# Patient Record
Sex: FEMALE | Race: WHITE | Employment: UNEMPLOYED | URBAN - METROPOLITAN AREA
[De-identification: names, ages, dates, MRNs, and addresses within clinical notes are randomized per-mention and may not be internally consistent; named-entity substitution may affect disease eponyms.]

---

## 2021-05-09 ENCOUNTER — APPOINTMENT (OUTPATIENT)
Dept: GENERAL RADIOLOGY | Age: 86
End: 2021-05-09
Payer: MEDICARE

## 2021-05-09 ENCOUNTER — APPOINTMENT (OUTPATIENT)
Dept: CT IMAGING | Age: 86
End: 2021-05-09
Payer: MEDICARE

## 2021-05-09 ENCOUNTER — HOSPITAL ENCOUNTER (EMERGENCY)
Age: 86
Discharge: HOME | End: 2021-05-09
Attending: STUDENT IN AN ORGANIZED HEALTH CARE EDUCATION/TRAINING PROGRAM
Payer: MEDICARE

## 2021-05-09 VITALS
DIASTOLIC BLOOD PRESSURE: 84 MMHG | SYSTOLIC BLOOD PRESSURE: 155 MMHG | RESPIRATION RATE: 18 BRPM | WEIGHT: 125 LBS | TEMPERATURE: 97.8 F | HEIGHT: 60 IN | HEART RATE: 84 BPM | BODY MASS INDEX: 24.54 KG/M2 | OXYGEN SATURATION: 98 %

## 2021-05-09 DIAGNOSIS — R03.0 ELEVATED BLOOD PRESSURE READING: ICD-10-CM

## 2021-05-09 DIAGNOSIS — K63.89 COLONIC MASS: Primary | ICD-10-CM

## 2021-05-09 DIAGNOSIS — N39.0 ACUTE UTI: ICD-10-CM

## 2021-05-09 DIAGNOSIS — R91.8 PULMONARY NODULES: ICD-10-CM

## 2021-05-09 DIAGNOSIS — C78.7 LIVER METASTASES (HCC): ICD-10-CM

## 2021-05-09 DIAGNOSIS — K13.21 LEUKOPLAKIA, TONGUE: ICD-10-CM

## 2021-05-09 DIAGNOSIS — R06.02 SHORTNESS OF BREATH: ICD-10-CM

## 2021-05-09 DIAGNOSIS — R10.84 GENERALIZED ABDOMINAL PAIN: ICD-10-CM

## 2021-05-09 DIAGNOSIS — J18.9 PNEUMONIA, UNSPECIFIED ORGANISM: ICD-10-CM

## 2021-05-09 LAB
A/G RATIO: 0.9 (ref 1.1–2.2)
ALBUMIN SERPL-MCNC: 3.6 G/DL (ref 3.4–5)
ALP BLD-CCNC: 413 U/L (ref 40–129)
ALT SERPL-CCNC: 79 U/L (ref 10–40)
ANION GAP SERPL CALCULATED.3IONS-SCNC: 13 MMOL/L (ref 3–16)
AST SERPL-CCNC: 119 U/L (ref 15–37)
BACTERIA: ABNORMAL /HPF
BASOPHILS ABSOLUTE: 0.1 K/UL (ref 0–0.2)
BASOPHILS RELATIVE PERCENT: 0.6 %
BILIRUB SERPL-MCNC: 1 MG/DL (ref 0–1)
BILIRUBIN URINE: NEGATIVE
BLOOD, URINE: ABNORMAL
BUN BLDV-MCNC: 13 MG/DL (ref 7–20)
CALCIUM SERPL-MCNC: 9.4 MG/DL (ref 8.3–10.6)
CHLORIDE BLD-SCNC: 94 MMOL/L (ref 99–110)
CLARITY: CLEAR
CO2: 25 MMOL/L (ref 21–32)
COLOR: YELLOW
CREAT SERPL-MCNC: 0.7 MG/DL (ref 0.6–1.2)
D DIMER: 1472 NG/ML DDU (ref 0–229)
EKG ATRIAL RATE: 76 BPM
EKG DIAGNOSIS: NORMAL
EKG P AXIS: 35 DEGREES
EKG P-R INTERVAL: 136 MS
EKG Q-T INTERVAL: 384 MS
EKG QRS DURATION: 90 MS
EKG QTC CALCULATION (BAZETT): 432 MS
EKG R AXIS: 51 DEGREES
EKG T AXIS: 32 DEGREES
EKG VENTRICULAR RATE: 76 BPM
EOSINOPHILS ABSOLUTE: 0.9 K/UL (ref 0–0.6)
EOSINOPHILS RELATIVE PERCENT: 9.8 %
EPITHELIAL CELLS, UA: ABNORMAL /HPF (ref 0–5)
GFR AFRICAN AMERICAN: >60
GFR NON-AFRICAN AMERICAN: >60
GLOBULIN: 3.8 G/DL
GLUCOSE BLD-MCNC: 93 MG/DL (ref 70–99)
GLUCOSE URINE: NEGATIVE MG/DL
HCT VFR BLD CALC: 37.2 % (ref 36–48)
HEMOGLOBIN: 12.3 G/DL (ref 12–16)
KETONES, URINE: 15 MG/DL
LEUKOCYTE ESTERASE, URINE: ABNORMAL
LIPASE: 28 U/L (ref 13–60)
LYMPHOCYTES ABSOLUTE: 1.6 K/UL (ref 1–5.1)
LYMPHOCYTES RELATIVE PERCENT: 17.1 %
MCH RBC QN AUTO: 29.9 PG (ref 26–34)
MCHC RBC AUTO-ENTMCNC: 33 G/DL (ref 31–36)
MCV RBC AUTO: 90.6 FL (ref 80–100)
MICROSCOPIC EXAMINATION: YES
MONOCYTES ABSOLUTE: 0.9 K/UL (ref 0–1.3)
MONOCYTES RELATIVE PERCENT: 9.8 %
NEUTROPHILS ABSOLUTE: 6 K/UL (ref 1.7–7.7)
NEUTROPHILS RELATIVE PERCENT: 62.7 %
NITRITE, URINE: POSITIVE
PDW BLD-RTO: 12.7 % (ref 12.4–15.4)
PH UA: 6 (ref 5–8)
PLATELET # BLD: 306 K/UL (ref 135–450)
PMV BLD AUTO: 8.3 FL (ref 5–10.5)
POTASSIUM REFLEX MAGNESIUM: 3.9 MMOL/L (ref 3.5–5.1)
PRO-BNP: 264 PG/ML (ref 0–449)
PROTEIN UA: NEGATIVE MG/DL
RBC # BLD: 4.11 M/UL (ref 4–5.2)
RBC UA: ABNORMAL /HPF (ref 0–4)
SARS-COV-2, NAAT: NOT DETECTED
SODIUM BLD-SCNC: 132 MMOL/L (ref 136–145)
SPECIFIC GRAVITY UA: <=1.005 (ref 1–1.03)
TOTAL PROTEIN: 7.4 G/DL (ref 6.4–8.2)
TROPONIN: <0.01 NG/ML
TSH REFLEX: 2.07 UIU/ML (ref 0.27–4.2)
URINE REFLEX TO CULTURE: ABNORMAL
URINE TYPE: ABNORMAL
UROBILINOGEN, URINE: 0.2 E.U./DL
WBC # BLD: 9.6 K/UL (ref 4–11)
WBC UA: ABNORMAL /HPF (ref 0–5)

## 2021-05-09 PROCEDURE — 93005 ELECTROCARDIOGRAM TRACING: CPT | Performed by: PHYSICIAN ASSISTANT

## 2021-05-09 PROCEDURE — 6360000004 HC RX CONTRAST MEDICATION: Performed by: PHYSICIAN ASSISTANT

## 2021-05-09 PROCEDURE — 85379 FIBRIN DEGRADATION QUANT: CPT

## 2021-05-09 PROCEDURE — 2580000003 HC RX 258: Performed by: PHYSICIAN ASSISTANT

## 2021-05-09 PROCEDURE — 83880 ASSAY OF NATRIURETIC PEPTIDE: CPT

## 2021-05-09 PROCEDURE — 74177 CT ABD & PELVIS W/CONTRAST: CPT

## 2021-05-09 PROCEDURE — 71260 CT THORAX DX C+: CPT

## 2021-05-09 PROCEDURE — 6370000000 HC RX 637 (ALT 250 FOR IP): Performed by: PHYSICIAN ASSISTANT

## 2021-05-09 PROCEDURE — 93010 ELECTROCARDIOGRAM REPORT: CPT | Performed by: INTERNAL MEDICINE

## 2021-05-09 PROCEDURE — 85025 COMPLETE CBC W/AUTO DIFF WBC: CPT

## 2021-05-09 PROCEDURE — 83690 ASSAY OF LIPASE: CPT

## 2021-05-09 PROCEDURE — 96365 THER/PROPH/DIAG IV INF INIT: CPT

## 2021-05-09 PROCEDURE — 87635 SARS-COV-2 COVID-19 AMP PRB: CPT

## 2021-05-09 PROCEDURE — 80053 COMPREHEN METABOLIC PANEL: CPT

## 2021-05-09 PROCEDURE — 84484 ASSAY OF TROPONIN QUANT: CPT

## 2021-05-09 PROCEDURE — 84443 ASSAY THYROID STIM HORMONE: CPT

## 2021-05-09 PROCEDURE — 6360000002 HC RX W HCPCS: Performed by: PHYSICIAN ASSISTANT

## 2021-05-09 PROCEDURE — 71045 X-RAY EXAM CHEST 1 VIEW: CPT

## 2021-05-09 PROCEDURE — 81001 URINALYSIS AUTO W/SCOPE: CPT

## 2021-05-09 PROCEDURE — 96375 TX/PRO/DX INJ NEW DRUG ADDON: CPT

## 2021-05-09 PROCEDURE — 99285 EMERGENCY DEPT VISIT HI MDM: CPT

## 2021-05-09 RX ORDER — AZITHROMYCIN 250 MG/1
250 TABLET, FILM COATED ORAL DAILY
Qty: 4 TABLET | Refills: 0 | Status: SHIPPED | OUTPATIENT
Start: 2021-05-09 | End: 2021-05-13

## 2021-05-09 RX ORDER — ONDANSETRON 2 MG/ML
4 INJECTION INTRAMUSCULAR; INTRAVENOUS ONCE
Status: COMPLETED | OUTPATIENT
Start: 2021-05-09 | End: 2021-05-09

## 2021-05-09 RX ORDER — CEFDINIR 300 MG/1
300 CAPSULE ORAL 2 TIMES DAILY
Qty: 20 CAPSULE | Refills: 0 | Status: ON HOLD | OUTPATIENT
Start: 2021-05-09 | End: 2021-05-23 | Stop reason: HOSPADM

## 2021-05-09 RX ORDER — 0.9 % SODIUM CHLORIDE 0.9 %
1000 INTRAVENOUS SOLUTION INTRAVENOUS ONCE
Status: COMPLETED | OUTPATIENT
Start: 2021-05-09 | End: 2021-05-09

## 2021-05-09 RX ORDER — AZITHROMYCIN 250 MG/1
500 TABLET, FILM COATED ORAL ONCE
Status: COMPLETED | OUTPATIENT
Start: 2021-05-09 | End: 2021-05-09

## 2021-05-09 RX ADMIN — AZITHROMYCIN 500 MG: 250 TABLET, FILM COATED ORAL at 19:22

## 2021-05-09 RX ADMIN — SODIUM CHLORIDE 1000 ML: 9 INJECTION, SOLUTION INTRAVENOUS at 15:17

## 2021-05-09 RX ADMIN — CEFTRIAXONE SODIUM 1000 MG: 1 INJECTION, POWDER, FOR SOLUTION INTRAMUSCULAR; INTRAVENOUS at 17:30

## 2021-05-09 RX ADMIN — IOPAMIDOL 75 ML: 755 INJECTION, SOLUTION INTRAVENOUS at 15:41

## 2021-05-09 RX ADMIN — IOPAMIDOL 85 ML: 755 INJECTION, SOLUTION INTRAVENOUS at 17:41

## 2021-05-09 RX ADMIN — ONDANSETRON HYDROCHLORIDE 4 MG: 2 INJECTION, SOLUTION INTRAMUSCULAR; INTRAVENOUS at 15:17

## 2021-05-09 ASSESSMENT — ENCOUNTER SYMPTOMS
BLOOD IN STOOL: 0
ABDOMINAL PAIN: 1
DIARRHEA: 0
COUGH: 0
NAUSEA: 1
SHORTNESS OF BREATH: 1
VOMITING: 0

## 2021-05-09 ASSESSMENT — PAIN SCALES - GENERAL: PAINLEVEL_OUTOF10: 6

## 2021-05-09 NOTE — ED PROVIDER NOTES
I independently examined and evaluated Emmy Taylor. In brief, Emmy Taylor is a 81 y.o. female with no significant past medical history but also lack of contact with the healthcare system, who presents to the ED complaining of multiple generalized symptoms. Patient reports at least a few weeks of generalized abdominal pain. She reports it is intermittent and nonfocal.  It is migratory, sometimes being in her upper abdomen and sometimes being in her lower abdomen. She denies nausea or vomiting. She does report decreased appetite because nothing tastes good and she sometimes feels like she has difficulty swallowing. She denies any choking episodes. She reports it as a aching moderate least severe pain. She denies diarrhea or constipation, states she typically has bowel movements every 2 to 3 days given less food intake. Denies vaginal bleeding or discharge or dysuria or increased urinary frequency. She denies any fever. She does report shortness of breath, she states she has chronic shortness of breath but it has been worse over the past 2 months. It is particularly worse when she exerts herself. She denies any chest pain. She reports occasional nonproductive cough. She denies any recent travel or surgery, hemoptysis, leg swelling, previous blood clot or malignancy, immobilization, OCP or hormone replacements. She does report weight loss. She denies any jaundice. She denies any alcohol or IV drug use. She denies any smoking history. Focused exam revealed   PHYSICAL EXAM  BP (!) 158/67   Pulse 81   Temp 97.8 °F (36.6 °C) (Oral)   Resp 18   Ht 5' (1.524 m)   Wt 125 lb (56.7 kg)   SpO2 97%   BMI 24.41 kg/m²    GENERAL APPEARANCE: Awake and alert. Cooperative. no distress. HENT: Normocephalic. Atraumatic. Mucous membranes are dry. nonscrapable film on tongue  NECK: Supple. Full range of motion of the neck without stiffness or pain.   EYES: PERRL. EOM's grossly intact. HEART/CHEST: RRR. No murmurs. Chest wall is not tender to palpation. LUNGS: Respirations unlabored. CTAB. Good air exchange. Speaking comfortably in full sentences. ABDOMEN: Mild generalized discomfort, no right upper quadrant focal tenderness to palpation. Negative Monroe sign. . Soft. Non-distended. No masses. No organomegaly. No guarding or rebound. MUSCULOSKELETAL: No extremity edema. Compartments soft. No deformity. No tenderness in the extremities. All extremities neurovascularly intact. SKIN: Warm and dry. No acute rashes. No jaundice. NEUROLOGICAL: Alert and oriented. CN's 2-12 intact. No gross facial drooping. Strength 5/5, sensation intact. PSYCHIATRIC: Normal mood and affect. ED course / MDM:   Overall well appearing patient, in no acute distress, presenting for generalized chronic abdominal pain and shortness of breath. Patient denies any medical history, but reports she does not see a doctor regularly physical exam remarkable for dry mucous membranes. Differential diagnosis includes but is not limited to: Malignancy, failure to thrive, pancreatitis, gastritis, PE,    CT CHEST PULMONARY EMBOLISM W CONTRAST   Final Result   1. Artifact degraded evaluation of the pulmonary arteries. No acute   pulmonary embolism to the proximal segmental arteries. 2. Opacities in the segmental and subsegmental left lower lobe airways. Aspiration and endobronchial lesion are in the differential.  Follow-up   recommended. 3. Nodular airspace disease in the right upper lobe and additional nodular   opacities measuring up to 0.4 cm. Correlate with presentation for early   pneumonia. Three-month follow-up recommended. 4. Nonspecific mediastinal and hilar adenopathy. Follow-up recommended. 5. Other findings as described. CT ABDOMEN PELVIS W IV CONTRAST Additional Contrast? None   Final Result   1.  Circumferential heterogeneous thickening of the ascending colon and circumferential thickening of the terminal ileum surrounding   edema/inflammation. Differential includes colonic mass. Recommend   colonoscopy for further evaluation. 2. Numerous hepatic nodules ranging in size from less than 1 cm to 2.8 cm. Recommend MRI of the liver for further evaluation. Differential includes   liver metastasis. 3. Gastrohepatic, estuardo hepatic, and peripancreatic adenopathy with multiple   nodes of low density suggestive of metastasis with central necrosis. 4. Although not well seen on the CT of the abdomen, there is a stellate   nodular density in the left lower lobe, partially seen. Recommend CT of the   chest for further evaluation. XR CHEST PORTABLE   Final Result   1. No acute radiographic abnormality in the chest.           The Ekg interpreted by me shows  normal sinus rhythm with a rate of 76  Axis is   Normal  QTc is  within an acceptable range  Intervals and Durations are unremarkable. ST Segments: nonspecific changes  No previous for comparison      ED Course as of May 10 2317   Sun May 09, 2021   1531 Lipase within normal limits, low suspicion for pancreatitis. [ER]   1531 BNP within normal limits, no evidence of fluid overload on exam or chest x-ray. Low suspicion for CHF. [ER]   1531 Troponin within normal limits. EKG without evidence of ischemia. Patient denies any chest pain, as of breath symptoms over 2 months. Low suspicion for ACS. [ER]   1533 Mild hyponatremia and hypochloremia. No other electrolyte abnormalities or evidence of kidney dysfunction. Patient will receive fluids. [ER]   1533 No leukocytosis, anemia, thrombocytopenia. [ER]   [id number] Liver function testing shows transaminitis of unknown etiology. Will obtain CT study. [ER]   1534 CXR: FINDINGS:  The cardiomediastinal silhouette is within normal range. Lungs are clear.   There is no focal pulmonary consolidation, pleural effusion, pneumothorax, or  evidence of airspace pulmonary edema.     IMPRESSION:  1. No acute radiographic abnormality in the chest.  ---------------------  Chest x-ray without evidence of pneumonia, pneumothorax, pleural effusion, mass, or pulmonary edema. [ER]   1618 CT abd/pelvis: IMPRESSION:  1. Circumferential heterogeneous thickening of the ascending colon and circumferential thickening of the terminal ileum surrounding edema/inflammation. Differential includes colonic mass. Recommend colonoscopy for further evaluation. 2. Numerous hepatic nodules ranging in size from less than 1 cm to 2.8 cm. Recommend MRI of the liver for further evaluation. Differential includes liver metastasis. 3. Gastrohepatic, estuardo hepatic, and peripancreatic adenopathy with multiple  nodes of low density suggestive of metastasis with central necrosis. 4. Although not well seen on the CT of the abdomen, there is a stellate nodular density in the left lower lobe, partially seen. Recommend CT of the chest for further evaluation.  ----------------------------  CT concerning for metastatic malignancy, will obtain chest imaging    [ER]   1619 CT abdomen pelvis with signs concerning for diffuse malignancy. Will obtain CT chest without contrast to further evaluate lesion noted in the left lower lobe. Patient has had stable shortness of breath over the past 2 months without any acute change. She denies any chest pain. She is not hypoxic. She has no other risk factors for pulmonary embolism other than newly found malignancy. Will obtain D-dimer to help further characterize whether or not patient requires CT PE study versus noncontrast CT of chest.    [ER]   1638 TSH within normal limits. Low suspicion for thyroid disease as the cause of weight loss. [ER]   1639 Covid swab negative. [ER]   8425 Urinalysis shows evidence of blood and infection. We'll treat with Rocephin.    [ER]   1651 D-dimer significantly elevated at 1472.   Will obtain CT PE study given shortness of breath, likely malignancy, and elevated D-dimer. She is receiving fluids. [ER]   1806 CT PE: IMPRESSION:  1. Artifact degraded evaluation of the pulmonary arteries. No acute  pulmonary embolism to the proximal segmental arteries. 2. Opacities in the segmental and subsegmental left lower lobe airways. Aspiration and endobronchial lesion are in the differential.  Follow-up  recommended. 3. Nodular airspace disease in the right upper lobe and additional nodular  opacities measuring up to 0.4 cm. Correlate with presentation for early  pneumonia. Three-month follow-up recommended. 4. Nonspecific mediastinal and hilar adenopathy. Follow-up recommended. 5. Other findings as described. [ER]   1857 Patient was offered admission for further workup and management of likely metastatic malignancy, but patient declines. Will plan for outpatient follow up    [ER]      ED Course User Index  [ER] Shy Sandy MD     Based on results of work-up, I am concerned for extensive metastatic malignancy, patient requires further workup and to discuss treatment options. Also patient has pneumonia and UTI. At this time, did recommend admission for expedited further work-up and management. However, patient declined. I discussed the nature and purpose, risks and benefits, as well as, the alternatives of admission with andrew Ashley. Annie Ramirez was given the time and opportunity to ask questions and consider their options, and after the discussion, Annie Ramirez decided to refuse. I informed Annie Ashley that refusal could lead to, but was not limited to, death, permanent disability, or severe pain. If present, I asked the relatives or significant others of Annie Ramirez to dissuade them without success. Prior to refusing, their nurse and I determined and agreed that Annie Ramirez had the capacity to make this decision and understood the consequences of that decision.  They appear clinically sober, to be mentating appropriately, free from distracting injury, appear to have intact insight, judgement, and reason. Specifically, they were able to verbally state back in a coherent manner their current medical condition, the proposed course of treatment, and the risks/benfits/ alternatives of treatment verses leaving against medical advice. After refusal, I made every reasonable opportunity to treat [place] Shaffer to the best of my ability. They understand that they may return to seek medical attention here whenever they choose. Consider this an informed discharge. Based on patient preference, patient will be discharged to follow-up with a primary care doctor, GI, and Oncology. Patient was provided with prescriptions for cefdinir and azithromycin. Strict return precautions given. Given referrals for PCP, GI, Oncology, Surgical Oncology. Patient discharged in stable condition. CLINICAL IMPRESSION  1. Colonic mass    2. Liver metastases ([facility] [address]    3. Pulmonary nodules    4. Pneumonia, unspecified organism    5. Acute UTI    6. Leukoplakia, tongue    7. Elevated blood pressure reading    8. Shortness of breath    9. Generalized abdominal pain        Blood pressure (!) 155/84, pulse 84, temperature 97.8 °F (36.6 °C), temperature source Oral, resp. rate 18, height 5' (1.524 m), weight 125 lb (56.7 kg), SpO2 98 %. Leland Gaona was discharged to home in stable condition. All diagnostic, treatment, and disposition decisions were made by myself in conjunction with the advanced practice provider. For all further details of the patient's emergency department visit, please see the advanced practice provider's documentation. Comment: Please note this report has been produced using speech recognition software and may contain errors related to that system including errors in grammar, punctuation, and spelling, as well as words and phrases that may be inappropriate.  If there are any

## 2021-05-09 NOTE — ED PROVIDER NOTES
[address] ED  EMERGENCY DEPARTMENT ENCOUNTER        Pt Name: Ave Cheema  MRN: [id number]  [place] 1935  Date of evaluation: 5/9/2021  Provider: Braxton Souza PA-C  PCP: No primary care provider on file. Shared Visit or Autonomous Visit:  I have seen and evaluated this patient with my supervising physician Kavitha Adhikari MD.    CHIEF COMPLAINT       Chief Complaint   Patient presents with    Other     80 yo with generalized c/o of not being well for past couple of mos with wt loss difficulty swallowing due to problem with tongue. Does not have a pcp. Denies any health problems.  Shortness of Breath     exertional SOB past couple of mos denies any lung problems no hx of smoking. HISTORY OF PRESENT ILLNESS   (Location/Symptom, Timing/Onset, Context/Setting, Quality, Duration, Modifying Factors, Severity)  Note limiting factors. Ave Cheema is a 81 y.o. female brought in by family for evaluation general fatigue and weakness, abdominal pain, nausea, shortness of breath and weight loss symptoms for the past 2 months. No fevers. No urinary symptoms. No cough. No leg swelling. States started getting sick about 9 days after her first COVID-19 vaccine Amarilys Luna was on January 26 had her second vaccine February 26. Denies any chest pain. No known heart or lung problems. Never a smoker. No leg swelling. The history is provided by the patient and a relative. Abdominal Pain  Pain location:  Generalized  Pain quality: aching    Pain radiates to:  Does not radiate  Onset quality:  Gradual  Duration: 2 months.   Progression:  Worsening  Chronicity:  New  Relieved by:  Nothing  Worsened by:  Palpation  Associated symptoms: fatigue, nausea and shortness of breath    Associated symptoms: no chest pain, no cough, no diarrhea, no dysuria, no fever and no vomiting    Shortness of Breath  Onset quality:  Gradual  Duration:  2 months  Progression: Worsening  Chronicity:  New  Associated symptoms: abdominal pain    Associated symptoms: no chest pain, no cough, no fever and no vomiting    Fatigue  Onset quality:  Gradual  Duration: 2 months. Progression:  Worsening  Chronicity:  New  Associated symptoms: abdominal pain, nausea and shortness of breath    Associated symptoms: no chest pain, no cough, no diarrhea, no dysuria, no fever, no loss of consciousness and no vomiting      Nursing Notes were reviewed    REVIEW OF SYSTEMS    (2-9 systems for level 4, 10 or more for level 5)     Review of Systems   Constitutional: Positive for fatigue and unexpected weight change. Negative for fever. Respiratory: Positive for shortness of breath. Negative for cough. Cardiovascular: Negative for chest pain and leg swelling. Gastrointestinal: Positive for abdominal pain and nausea. Negative for blood in stool, diarrhea and vomiting. Genitourinary: Negative for difficulty urinating and dysuria. Neurological: Negative for loss of consciousness. All other systems reviewed and are negative. Positives and Pertinent negatives as per HPI. PAST MEDICAL HISTORY   History reviewed. No pertinent past medical history. SURGICAL HISTORY     Past Surgical History:   Procedure Laterality Date    APPENDECTOMY      HYSTERECTOMY           CURRENTMEDICATIONS       Discharge Medication List as of 5/9/2021  7:14 PM            ALLERGIES     Patient has no known allergies. FAMILYHISTORY     History reviewed. No pertinent family history. SOCIAL HISTORY       Social History     Socioeconomic History    Marital status:       Spouse name: None    Number of children: None    Years of education: None    Highest education level: None   Occupational History    None   Social Needs    Financial resource strain: None    Food insecurity     Worry: None     Inability: None    Transportation needs     Medical: None     Non-medical: None   Tobacco Use    Smoking status: Never Smoker   Substance and Sexual Activity    Alcohol use: Never     Frequency: Never    Drug use: None    Sexual activity: None   Lifestyle    Physical activity     Days per week: None     Minutes per session: None    Stress: None   Relationships    Social connections     Talks on phone: None     Gets together: None     Attends Jehov[employer] witness service: None     Active member of club or organization: None     Attends meetings of clubs or organizations: None     Relationship status: None    Intimate partner violence     Fear of current or ex partner: None     Emotionally abused: None     Physically abused: None     Forced sexual activity: None   Other Topics Concern    None   Social History Narrative    None       SCREENINGS    Brad Coma Scale  Eye Opening: Spontaneous  Best Verbal Response: None        PHYSICAL EXAM    (up to 7 for level 4, 8 or more for level 5)     ED Triage Vitals [05/09/21 1347]   BP Temp Temp Source Pulse Resp SpO2 Height Weight   (!) 184/58 97.8 °F (36.6 °C) Oral 82 18 97 % 5' (1.524 m) 125 lb (56.7 kg)       Physical Exam  Vitals signs and nursing note reviewed. Constitutional:       Appearance: She is well-developed. She is not toxic-appearing. HENT:      Head: Normocephalic and atraumatic. Mouth/Throat:      Mouth: Mucous membranes are dry. Pharynx: Oropharynx is clear. No oropharyngeal exudate or posterior oropharyngeal erythema. Comments: Plaque on tongue  Eyes:      Conjunctiva/sclera: Conjunctivae normal.      Pupils: Pupils are equal, round, and reactive to light. Neck:      Musculoskeletal: Normal range of motion and neck supple. Vascular: No JVD. Cardiovascular:      Rate and Rhythm: Normal rate and regular rhythm. Pulmonary:      Effort: Pulmonary effort is normal. No respiratory distress. Breath sounds: Normal breath sounds. No wheezing, rhonchi or rales.    Abdominal:      General: Bowel sounds are normal. There is no distension. Palpations: Abdomen is soft. Abdomen is not rigid. There is no mass. Tenderness: There is generalized abdominal tenderness (mild). There is no right CVA tenderness, left CVA tenderness, guarding or rebound. Musculoskeletal: Normal range of motion. Right lower leg: No edema. Left lower leg: No edema. Skin:     General: Skin is warm and dry. Findings: No rash. Neurological:      Mental Status: She is alert and oriented to person, place, and time. GCS: GCS eye subscore is 4. GCS verbal subscore is 5. GCS motor subscore is 6. Cranial Nerves: No cranial nerve deficit. Sensory: No sensory deficit. Motor: No abnormal muscle tone.       Coordination: Coordination normal.   Psychiatric:         Behavior: Behavior normal.         DIAGNOSTIC RESULTS   LABS:    Labs Reviewed   CBC WITH AUTO DIFFERENTIAL - Abnormal; Notable for the following components:       Result Value    Eosinophils Absolute 0.9 (*)     All other components within normal limits    Narrative:     Performed at:  [place] [address],  [facility]   Phone [phone]   COMPREHENSIVE METABOLIC PANEL W/ REFLEX TO MG FOR LOW K - Abnormal; Notable for the following components:    Sodium 132 (*)     Chloride 94 (*)     Albumin/Globulin Ratio 0.9 (*)     Alkaline Phosphatase 413 (*)     ALT 79 (*)      (*)     All other components within normal limits    Narrative:     Performed at:  [place] [address],  [place], [facility]   Phone [phone]   URINE RT REFLEX TO CULTURE - Abnormal; Notable for the following components:    Ketones, Urine 15 (*)     Blood, Urine SMALL (*)     Nitrite, Urine POSITIVE (*)     Leukocyte Esterase, Urine SMALL (*)     All other components within normal limits    Narrative:     Performed at:  [facility]) [place] [address],  [place], [place] [address]   Phone [phone]   MICROSCOPIC URINALYSIS - Abnormal; Notable for the following components:    WBC, UA 6-9 (*)     Bacteria, UA 4+ (*)     All other components within normal limits    Narrative:     Performed at:  [facility] [address],  [place], [employer] [facility]   Phone [phone]   D-DIMER, QUANTITATIVE - Abnormal; Notable for the following components:    D-Dimer, Quant 1472 (*)     All other components within normal limits    Narrative:     Performed at:  [facility] [address],  [place], [employer] [facility]   Phone [phone], RAPID    Narrative:     Performed at:  [place] [address],  [place], [employer] [facility]   Phone [phone]   TROPONIN    Narrative:     Performed at:  [place] [address],  [place], [place] [employer]   Phone [phone]   BRAIN NATRIURETIC PEPTIDE    Narrative:     Performed at:  [place] [address],  [place], [place] [employer]   Phone [phone]   LIPASE    Narrative:     Performed at:  [place] [address],  [place], [employer] [facility]   Phone [phone]   TSH WITH REFLEX    Narrative:     Performed at:  [facility] [address],  [employer]   Phone [phone]     Results for orders placed or performed during the hospital encounter of 05/09/21   COVID-19, Rapid    Specimen: Nasopharyngeal Swab; Throat   Result Value Ref Range    SARS-CoV-2, NAAT Not Detected Not Detected   CBC Auto Differential   Result Value Ref Range    WBC 9.6 4.0 - 11.0 K/uL    RBC 4.11 4.00 - 5.20 M/uL    Hemoglobin 12.3 12.0 - 16.0 g/dL    Hematocrit 37.2 36.0 - 48.0 %    MCV 90.6 80.0 - 100.0 fL    MCH 29.9 26.0 - 34.0 pg    MCHC 33.0 31.0 - 36.0 g/dL    RDW 12.7 12.4 - 15.4 %    Platelets 907 601 - 219 K/uL    MPV 8.3 5.0 - 10.5 fL    Neutrophils % 62.7 %    Lymphocytes % 17.1 %    Monocytes % 9.8 %    Eosinophils % 9.8 %    Basophils % 0.6 %    Neutrophils Absolute 6.0 1.7 - 7.7 K/uL    Lymphocytes Absolute 1.6 1.0 - 5.1 K/uL    Monocytes Absolute 0.9 0.0 - 1.3 K/uL    Eosinophils Absolute 0.9 (H) 0.0 - 0.6 K/uL    Basophils Absolute 0.1 0.0 - 0.2 K/uL   Comprehensive Metabolic Panel w/ Reflex to MG   Result Value Ref Range    Sodium 132 (L) 136 - 145 mmol/L    Potassium reflex Magnesium 3.9 3.5 - 5.1 mmol/L    Chloride 94 (L) 99 - 110 mmol/L    CO2 25 21 - 32 mmol/L    Anion Gap 13 3 - 16    Glucose 93 70 - 99 mg/dL    BUN 13 7 - 20 mg/dL    CREATININE 0.7 0.6 - 1.2 mg/dL    GFR Non-African American >60 >60    GFR African American >60 >60    Calcium 9.4 8.3 - 10.6 mg/dL    Total Protein 7.4 6.4 - 8.2 g/dL    Albumin 3.6 3.4 - 5.0 g/dL    Albumin/Globulin Ratio 0.9 (L) 1.1 - 2.2    Total Bilirubin 1.0 0.0 - 1.0 mg/dL    Alkaline Phosphatase 413 (H) 40 - 129 U/L    ALT 79 (H) 10 - 40 U/L     (H) 15 - 37 U/L    Globulin 3.8 g/dL   Troponin   Result Value Ref Range    Troponin <0.01 <0.01 ng/mL   Brain Natriuretic Peptide   Result Value Ref Range    Pro- 0 - 449 pg/mL   Urinalysis Reflex to Culture    Specimen: Urine, clean catch   Result Value Ref Range    Color, UA Yellow Straw/Yellow    Clarity, UA Clear Clear    Glucose, Ur Negative Negative mg/dL    Bilirubin Urine Negative Negative    Ketones, Urine 15 (A) Negative mg/dL    Specific Gravity, UA <=1.005 1.005 - 1.030    Blood, Urine SMALL (A) Negative    pH, UA 6.0 5.0 - 8.0    Protein, UA Negative Negative mg/dL    Urobilinogen, Urine 0.2 <2.0 E.U./dL    Nitrite, Urine POSITIVE (A) Negative    Leukocyte Esterase, Urine SMALL (A) Negative    Microscopic Examination YES     Urine Type see below     Urine Reflex to Culture Not Indicated    Lipase   Result Value Ref Range    Lipase 28.0 13.0 - 60.0 U/L   TSH with Reflex   Result Value Ref Range    TSH 2.07 0.27 None   Preliminary Result   1. Circumferential heterogeneous thickening of the ascending colon and   circumferential thickening of the terminal ileum surrounding   edema/inflammation. Differential includes colonic mass. Recommend   colonoscopy for further evaluation. 2. Numerous hepatic nodules ranging in size from less than 1 cm to 2.8 cm. Recommend MRI of the liver for further evaluation. Differential includes   liver metastasis. 3. Gastrohepatic, estuardo hepatic, and peripancreatic adenopathy with multiple   nodes of low density suggestive of metastasis with central necrosis. 4. Although not well seen on the CT of the abdomen, there is a stellate   nodular density in the left lower lobe, partially seen. Recommend CT of the   chest for further evaluation. XR CHEST PORTABLE   Final Result   1. No acute radiographic abnormality in the chest.           Ct Abdomen Pelvis W Iv Contrast Additional Contrast? None    Result Date: 5/9/2021  EXAMINATION: CT OF THE ABDOMEN AND PELVIS WITH CONTRAST 5/9/2021 3:41 pm TECHNIQUE: CT of the abdomen and pelvis was performed with the administration of intravenous contrast. Multiplanar reformatted images are provided for review. Dose modulation, iterative reconstruction, and/or weight based adjustment of the mA/kV was utilized to reduce the radiation dose to as low as reasonably achievable. COMPARISON: None HISTORY: ORDERING SYSTEM PROVIDED HISTORY: abdominal pain, nausea, elevated LFTs, weight loss TECHNOLOGIST PROVIDED HISTORY: Reason for exam:->abdominal pain, nausea, elevated LFTs, weight loss Additional Contrast?->None Decision Support Exception - unselect if not a suspected or confirmed emergency medical condition->Emergency Medical Condition (MA) Reason for Exam: Pt states she has been out of energy, and is having upper abdominal pain with standing. Acuity: Acute Type of Exam: Initial FINDINGS: Lower Chest: No pleural effusion.   Although not completely imaged CT of the chest for further evaluation. Xr Chest Portable    Result Date: 5/9/2021  EXAMINATION: ONE XRAY VIEW OF THE CHEST 5/9/2021 1:57 pm COMPARISON: None HISTORY: ORDERING SYSTEM PROVIDED HISTORY: shortness of breath TECHNOLOGIST PROVIDED HISTORY: Reason for exam:->shortness of breath Reason for Exam: Shortness of breath Acuity: Acute Type of Exam: Initial FINDINGS: The cardiomediastinal silhouette is within normal range. Lungs are clear. There is no focal pulmonary consolidation, pleural effusion, pneumothorax, or evidence of airspace pulmonary edema. 1. No acute radiographic abnormality in the chest.     Ct Chest Pulmonary Embolism W Contrast    Result Date: 5/9/2021  EXAMINATION: CTA OF THE CHEST 5/9/2021 5:41 pm TECHNIQUE: CTA of the chest was performed after the administration of intravenous contrast.  Multiplanar reformatted images are provided for review. MIP images are provided for review. Dose modulation, iterative reconstruction, and/or weight based adjustment of the mA/kV was utilized to reduce the radiation dose to as low as reasonably achievable. COMPARISON: None. HISTORY: ORDERING SYSTEM PROVIDED HISTORY: shortness of breath, elevated d-dimer, r/o PE TECHNOLOGIST PROVIDED HISTORY: Reason for exam:->shortness of breath, elevated d-dimer, r/o PE Decision Support Exception - unselect if not a suspected or confirmed emergency medical condition->Emergency Medical Condition (MA) Reason for Exam: elevated d dimer, pt having some fatique and sob Acuity: Acute Type of Exam: Initial FINDINGS: Pulmonary Arteries: Pulmonary arteries are within normal limits in size. . Artifact degraded evaluation of the pulmonary arteries. No filling defect is identified in the pulmonary arteries to the level of theproximal segmental arteries. Mediastinum: Heart is within normal limits in size. Nodular pericardial thickening. Aorta is within normal limits in size.  No luminal defect is appreciated in the visualized thoracic aorta. Coronary artery calcifications noted. Lungs/pleura: Central airways are patent. Opacification of segmental and subsegmental bronchi in the left lower lobe. Nodular airspace disease in the right upper lobe. Scattered additional nodular opacities measuring up to 0.4 cm. Scattered atelectasis noted. No pleural effusion. No pneumothorax. Soft Tissues/Bones: Mediastinal and hilar adenopathy. Scattered degenerative changes noted in the visualized spine without spondylolisthesis. Upper Abdomen: Granuloma in the spleen. 1.  Artifact degraded evaluation of the pulmonary arteries. No acute pulmonary embolism to the proximal segmental arteries. 2. Opacities in the segmental and subsegmental left lower lobe airways. Aspiration and endobronchial lesion are in the differential.  Follow-up recommended. 3. Nodular airspace disease in the right upper lobe and additional nodular opacities measuring up to 0.4 cm. Correlate with presentation for early pneumonia. Three-month follow-up recommended. 4. Nonspecific mediastinal and hilar adenopathy. Follow-up recommended. 5. Other findings as described.          PROCEDURES   Unless otherwise noted below, none     Procedures    CRITICAL CARE TIME   N/A    CONSULTS:  None      EMERGENCY DEPARTMENT COURSE and DIFFERENTIAL DIAGNOSIS/MDM:   Vitals:    Vitals:    05/09/21 1600 05/09/21 1700 05/09/21 1756 05/09/21 1900   BP: (!) 155/85 (!) 150/84 (!) 145/84 (!) 155/84   Pulse: 75 74 78 84   Resp: 16 16 18 18   Temp:       TempSrc:       SpO2: 98% 98% 98% 98%   Weight:       Height:           Patient was given thefollowing medications:  Medications   0.9 % sodium chloride bolus (0 mLs Intravenous Stopped 5/9/21 1617)   ondansetron (ZOFRAN) injection 4 mg (4 mg Intravenous Given 5/9/21 1517)   iopamidol (ISOVUE-370) 76 % injection 75 mL (75 mLs Intravenous Given 5/9/21 1541)   cefTRIAXone (ROCEPHIN) 1000 mg IVPB in 50 mL D5W minibag (0 mg Intravenous Stopped 5/9/21 1800) iopamidol (ISOVUE-370) 76 % injection 85 mL (85 mLs Intravenous Given 5/9/21 1741)   azithromycin (ZITHROMAX) tablet 500 mg (500 mg Oral Given 5/9/21 1922)         6:49 PM EDT  Patient presented for evaluation of abdominal pain, general fatigue, weight loss and shortness of breath. Work-up was obtained. On labs white count 9.6. Hemoglobin 12.3. Hematocrit 37.2. Sodium 132. Potassium 3.9. Chloride 94. Glucose 93. Normal renal function. Elevated LFTs alk phos 413 ALT 79 . EKG normal sinus rhythm nonspecific ST changes. Troponin is normal.  . Urinalysis positive nitrites and leukocyte esterase treated for UTI with Rocephin. CT abdomen pelvis showing thickening of the colon suspect colon mass. Lesions in the liver suspect metastasis. Gastric and  Pancreatic nodules. CT chest no PE. Pulmonary nodules and possible pneumonia. Offered admission into the hospital for further evaluation of CT findings discussed with her these findings discussed suspect cancer. She understands. She is declining admission. She does not want to stay in the hospital.  She wants to go home. Family at bedside during discussion. Patient complaining of spot on her tongue this appears to be leukoplakia. Does not appear to be thrush there are no other plaques. Suspect this is due to cancer. Referrals provided for primary care, gastroenterology, discussed she will need a colonoscopy, oncology and surgical oncology. We discussed the importance of close follow-up to contact physicians tomorrow to arrange follow-up. Family understands. Prescriptions for Zithromax and Omnicef. Advised return to the ER for any worsening symptoms. I estimate there is LOW risk for ACUTE APPENDICITIS, BOWEL OBSTRUCTION, CHOLECYSTITIS, DIVERTICULITIS, INCARCERATED HERNIA, PANCREATITIS, PERFORATED BOWEL, BOWEL ISCHEMIA, GONADAL TORSION, OR CARDIAC ISCHEMIA, thus I consider the discharge disposition reasonable.  Also, there is no evidence or peritonitis, sepsis, or toxicity. I estimate there is LOW risk for PULMONARY EMBOLISM, PNEUMOTHORAX, STATUS ASTHMATICUS, ACUTE RESPIRATORY FAILURE, OR ACUTE CORONARY SYNDROME, thus I consider the discharge disposition reasonable. FINAL IMPRESSION      1. Colonic mass    2. Liver metastases ([address]    3. Pulmonary nodules    4. Pneumonia, unspecified organism    5. Acute UTI    6. Leukoplakia, tongue    7. Elevated blood pressure reading    8.  Shortness of breath    9. Generalized abdominal pain          DISPOSITION/PLAN   DISPOSITION     PATIENT REFERREDTO:  [facility]) Pre-Services  [phone]  Schedule an appointment as soon as possible for a visit   Primary care referral    Kalman Dandy, [address] [employer],[facility] [address], [address] [facility] [phone]    Schedule an appointment as soon as possible for a visit   Gastroenterology referral    Atiya Gray MD  [address]     Schedule an appointment as soon as possible for a visit   Oncology referral    José Gramajo MD  [address]  [facility] [address]  [phone]    Schedule an appointment as soon as possible for a visit   Surgical oncology    [facility] ED  [address] [facility]  [phone]    If symptoms worsen      DISCHARGE MEDICATIONS:  Discharge Medication List as of 5/9/2021  7:14 PM      START taking these medications    Details   cefdinir (OMNICEF) 300 MG capsule Take 1 capsule by mouth 2 times daily for 10 days, Disp-20 capsule, R-0Print      azithromycin (ZITHROMAX) 250 MG tablet Take 1 tablet by mouth daily for 4 days, Disp-4 tablet, R-0Print             DISCONTINUED MEDICATIONS:  Discharge Medication List as of 5/9/2021  7:14 PM                 (Please note that portions ofthis note were completed with a voice recognition program.  Efforts were made to edit the dictations but occasionally words are mis-transcribed.)    Selvin Cherry PA-C (electronically signed)            Abbey Martin PA-C  05/09/21 1942

## 2021-05-17 ENCOUNTER — APPOINTMENT (OUTPATIENT)
Dept: CT IMAGING | Age: 86
DRG: 330 | End: 2021-05-17
Payer: MEDICARE

## 2021-05-17 ENCOUNTER — APPOINTMENT (OUTPATIENT)
Dept: GENERAL RADIOLOGY | Age: 86
DRG: 330 | End: 2021-05-17
Payer: MEDICARE

## 2021-05-17 ENCOUNTER — HOSPITAL ENCOUNTER (INPATIENT)
Age: 86
LOS: 6 days | Discharge: HOME | DRG: 330 | End: 2021-05-23
Attending: STUDENT IN AN ORGANIZED HEALTH CARE EDUCATION/TRAINING PROGRAM | Admitting: SURGERY
Payer: MEDICARE

## 2021-05-17 DIAGNOSIS — R74.8 ELEVATED LIVER ENZYMES: ICD-10-CM

## 2021-05-17 DIAGNOSIS — N30.01 ACUTE CYSTITIS WITH HEMATURIA: ICD-10-CM

## 2021-05-17 DIAGNOSIS — K63.89 COLONIC MASS: ICD-10-CM

## 2021-05-17 DIAGNOSIS — K56.7 ILEUS (HCC): Primary | ICD-10-CM

## 2021-05-17 DIAGNOSIS — K92.2 UPPER GI BLEED: ICD-10-CM

## 2021-05-17 DIAGNOSIS — C78.7 LIVER METASTASES (HCC): ICD-10-CM

## 2021-05-17 PROBLEM — K56.609 SMALL BOWEL OBSTRUCTION (HCC): Status: ACTIVE | Noted: 2021-05-17

## 2021-05-17 LAB
A/G RATIO: 0.9 (ref 1.1–2.2)
ALBUMIN SERPL-MCNC: 3.9 G/DL (ref 3.4–5)
ALP BLD-CCNC: 584 U/L (ref 40–129)
ALT SERPL-CCNC: 167 U/L (ref 10–40)
ANION GAP SERPL CALCULATED.3IONS-SCNC: 15 MMOL/L (ref 3–16)
APTT: 27 SEC (ref 24.2–36.2)
AST SERPL-CCNC: 188 U/L (ref 15–37)
BACTERIA: ABNORMAL /HPF
BASOPHILS ABSOLUTE: 0.1 K/UL (ref 0–0.2)
BASOPHILS RELATIVE PERCENT: 0.6 %
BILIRUB SERPL-MCNC: 1.3 MG/DL (ref 0–1)
BILIRUBIN URINE: ABNORMAL
BLOOD, URINE: ABNORMAL
BUN BLDV-MCNC: 14 MG/DL (ref 7–20)
CALCIUM SERPL-MCNC: 9.9 MG/DL (ref 8.3–10.6)
CHLORIDE BLD-SCNC: 87 MMOL/L (ref 99–110)
CLARITY: ABNORMAL
CO2: 29 MMOL/L (ref 21–32)
COLOR: YELLOW
CREAT SERPL-MCNC: 0.8 MG/DL (ref 0.6–1.2)
EKG ATRIAL RATE: 85 BPM
EKG DIAGNOSIS: NORMAL
EKG P AXIS: 24 DEGREES
EKG P-R INTERVAL: 130 MS
EKG Q-T INTERVAL: 378 MS
EKG QRS DURATION: 82 MS
EKG QTC CALCULATION (BAZETT): 449 MS
EKG R AXIS: 42 DEGREES
EKG T AXIS: 61 DEGREES
EKG VENTRICULAR RATE: 85 BPM
EOSINOPHILS ABSOLUTE: 0.2 K/UL (ref 0–0.6)
EOSINOPHILS RELATIVE PERCENT: 1.3 %
EPITHELIAL CELLS, UA: ABNORMAL /HPF (ref 0–5)
GFR AFRICAN AMERICAN: >60
GFR NON-AFRICAN AMERICAN: >60
GLOBULIN: 4.3 G/DL
GLUCOSE BLD-MCNC: 95 MG/DL (ref 70–99)
GLUCOSE URINE: NEGATIVE MG/DL
HCT VFR BLD CALC: 39.9 % (ref 36–48)
HEMOGLOBIN: 13.2 G/DL (ref 12–16)
INR BLD: 1.14 (ref 0.86–1.14)
KETONES, URINE: 40 MG/DL
LEUKOCYTE ESTERASE, URINE: ABNORMAL
LIPASE: 34 U/L (ref 13–60)
LYMPHOCYTES ABSOLUTE: 1.8 K/UL (ref 1–5.1)
LYMPHOCYTES RELATIVE PERCENT: 13.4 %
MCH RBC QN AUTO: 29.9 PG (ref 26–34)
MCHC RBC AUTO-ENTMCNC: 33.2 G/DL (ref 31–36)
MCV RBC AUTO: 90.3 FL (ref 80–100)
MICROSCOPIC EXAMINATION: YES
MONOCYTES ABSOLUTE: 1.1 K/UL (ref 0–1.3)
MONOCYTES RELATIVE PERCENT: 8.2 %
MUCUS: ABNORMAL /LPF
NEUTROPHILS ABSOLUTE: 10.2 K/UL (ref 1.7–7.7)
NEUTROPHILS RELATIVE PERCENT: 76.5 %
NITRITE, URINE: NEGATIVE
OCCULT BLOOD DIAGNOSTIC: ABNORMAL
PDW BLD-RTO: 12.7 % (ref 12.4–15.4)
PH UA: 6 (ref 5–8)
PLATELET # BLD: 392 K/UL (ref 135–450)
PMV BLD AUTO: 8.8 FL (ref 5–10.5)
POTASSIUM REFLEX MAGNESIUM: 3.7 MMOL/L (ref 3.5–5.1)
PROTEIN UA: ABNORMAL MG/DL
PROTHROMBIN TIME: 13.2 SEC (ref 10–13.2)
RBC # BLD: 4.42 M/UL (ref 4–5.2)
RBC UA: ABNORMAL /HPF (ref 0–4)
SARS-COV-2, NAAT: NOT DETECTED
SODIUM BLD-SCNC: 131 MMOL/L (ref 136–145)
SPECIFIC GRAVITY UA: 1.02 (ref 1–1.03)
TOTAL PROTEIN: 8.2 G/DL (ref 6.4–8.2)
TROPONIN: <0.01 NG/ML
URINE REFLEX TO CULTURE: YES
URINE TYPE: ABNORMAL
UROBILINOGEN, URINE: 0.2 E.U./DL
WBC # BLD: 13.4 K/UL (ref 4–11)
WBC UA: ABNORMAL /HPF (ref 0–5)

## 2021-05-17 PROCEDURE — 85025 COMPLETE CBC W/AUTO DIFF WBC: CPT

## 2021-05-17 PROCEDURE — 80053 COMPREHEN METABOLIC PANEL: CPT

## 2021-05-17 PROCEDURE — 2580000003 HC RX 258: Performed by: SURGERY

## 2021-05-17 PROCEDURE — 99223 1ST HOSP IP/OBS HIGH 75: CPT | Performed by: SURGERY

## 2021-05-17 PROCEDURE — 6360000004 HC RX CONTRAST MEDICATION: Performed by: PHYSICIAN ASSISTANT

## 2021-05-17 PROCEDURE — 2500000003 HC RX 250 WO HCPCS: Performed by: SURGERY

## 2021-05-17 PROCEDURE — G0328 FECAL BLOOD SCRN IMMUNOASSAY: HCPCS

## 2021-05-17 PROCEDURE — 93005 ELECTROCARDIOGRAM TRACING: CPT | Performed by: PHYSICIAN ASSISTANT

## 2021-05-17 PROCEDURE — 6360000002 HC RX W HCPCS: Performed by: SURGERY

## 2021-05-17 PROCEDURE — 83690 ASSAY OF LIPASE: CPT

## 2021-05-17 PROCEDURE — 6370000000 HC RX 637 (ALT 250 FOR IP): Performed by: PHYSICIAN ASSISTANT

## 2021-05-17 PROCEDURE — 96365 THER/PROPH/DIAG IV INF INIT: CPT

## 2021-05-17 PROCEDURE — 1200000000 HC SEMI PRIVATE

## 2021-05-17 PROCEDURE — 85730 THROMBOPLASTIN TIME PARTIAL: CPT

## 2021-05-17 PROCEDURE — 71045 X-RAY EXAM CHEST 1 VIEW: CPT

## 2021-05-17 PROCEDURE — 94150 VITAL CAPACITY TEST: CPT

## 2021-05-17 PROCEDURE — 6360000002 HC RX W HCPCS: Performed by: PHYSICIAN ASSISTANT

## 2021-05-17 PROCEDURE — 87086 URINE CULTURE/COLONY COUNT: CPT

## 2021-05-17 PROCEDURE — 93010 ELECTROCARDIOGRAM REPORT: CPT | Performed by: INTERNAL MEDICINE

## 2021-05-17 PROCEDURE — 94761 N-INVAS EAR/PLS OXIMETRY MLT: CPT

## 2021-05-17 PROCEDURE — 74177 CT ABD & PELVIS W/CONTRAST: CPT

## 2021-05-17 PROCEDURE — 96375 TX/PRO/DX INJ NEW DRUG ADDON: CPT

## 2021-05-17 PROCEDURE — 85610 PROTHROMBIN TIME: CPT

## 2021-05-17 PROCEDURE — C9113 INJ PANTOPRAZOLE SODIUM, VIA: HCPCS | Performed by: PHYSICIAN ASSISTANT

## 2021-05-17 PROCEDURE — 96366 THER/PROPH/DIAG IV INF ADDON: CPT

## 2021-05-17 PROCEDURE — 81001 URINALYSIS AUTO W/SCOPE: CPT

## 2021-05-17 PROCEDURE — 84484 ASSAY OF TROPONIN QUANT: CPT

## 2021-05-17 PROCEDURE — 87635 SARS-COV-2 COVID-19 AMP PRB: CPT

## 2021-05-17 PROCEDURE — 99283 EMERGENCY DEPT VISIT LOW MDM: CPT

## 2021-05-17 PROCEDURE — 2580000003 HC RX 258: Performed by: PHYSICIAN ASSISTANT

## 2021-05-17 RX ORDER — ONDANSETRON 2 MG/ML
4 INJECTION INTRAMUSCULAR; INTRAVENOUS EVERY 6 HOURS PRN
Status: DISCONTINUED | OUTPATIENT
Start: 2021-05-17 | End: 2021-05-23 | Stop reason: HOSPADM

## 2021-05-17 RX ORDER — PANTOPRAZOLE SODIUM 40 MG/10ML
40 INJECTION, POWDER, LYOPHILIZED, FOR SOLUTION INTRAVENOUS ONCE
Status: COMPLETED | OUTPATIENT
Start: 2021-05-17 | End: 2021-05-17

## 2021-05-17 RX ORDER — PROMETHAZINE HYDROCHLORIDE 25 MG/ML
6.25 INJECTION, SOLUTION INTRAMUSCULAR; INTRAVENOUS EVERY 6 HOURS PRN
Status: DISCONTINUED | OUTPATIENT
Start: 2021-05-17 | End: 2021-05-23 | Stop reason: HOSPADM

## 2021-05-17 RX ORDER — SODIUM CHLORIDE 9 MG/ML
INJECTION, SOLUTION INTRAVENOUS CONTINUOUS
Status: DISCONTINUED | OUTPATIENT
Start: 2021-05-17 | End: 2021-05-23 | Stop reason: HOSPADM

## 2021-05-17 RX ORDER — ACETAMINOPHEN 325 MG/1
650 TABLET ORAL EVERY 6 HOURS PRN
Status: DISCONTINUED | OUTPATIENT
Start: 2021-05-17 | End: 2021-05-18

## 2021-05-17 RX ORDER — 0.9 % SODIUM CHLORIDE 0.9 %
1000 INTRAVENOUS SOLUTION INTRAVENOUS ONCE
Status: COMPLETED | OUTPATIENT
Start: 2021-05-17 | End: 2021-05-17

## 2021-05-17 RX ADMIN — ENOXAPARIN SODIUM 40 MG: 40 INJECTION SUBCUTANEOUS at 20:11

## 2021-05-17 RX ADMIN — IOPAMIDOL 75 ML: 755 INJECTION, SOLUTION INTRAVENOUS at 13:54

## 2021-05-17 RX ADMIN — CEFTRIAXONE SODIUM 1000 MG: 1 INJECTION, POWDER, FOR SOLUTION INTRAMUSCULAR; INTRAVENOUS at 13:33

## 2021-05-17 RX ADMIN — HYDROMORPHONE HYDROCHLORIDE 0.5 MG: 1 INJECTION, SOLUTION INTRAMUSCULAR; INTRAVENOUS; SUBCUTANEOUS at 20:17

## 2021-05-17 RX ADMIN — ONDANSETRON HYDROCHLORIDE 4 MG: 2 INJECTION, SOLUTION INTRAMUSCULAR; INTRAVENOUS at 20:18

## 2021-05-17 RX ADMIN — PANTOPRAZOLE SODIUM 40 MG: 40 INJECTION, POWDER, FOR SOLUTION INTRAVENOUS at 13:26

## 2021-05-17 RX ADMIN — SODIUM CHLORIDE: 9 INJECTION, SOLUTION INTRAVENOUS at 18:58

## 2021-05-17 RX ADMIN — SODIUM CHLORIDE 1000 ML: 9 INJECTION, SOLUTION INTRAVENOUS at 13:26

## 2021-05-17 RX ADMIN — FAMOTIDINE 20 MG: 10 INJECTION, SOLUTION INTRAVENOUS at 20:11

## 2021-05-17 RX ADMIN — NYSTATIN 500000 UNITS: 500000 SUSPENSION ORAL at 13:25

## 2021-05-17 ASSESSMENT — PAIN SCALES - GENERAL
PAINLEVEL_OUTOF10: 5
PAINLEVEL_OUTOF10: 8

## 2021-05-17 ASSESSMENT — PAIN DESCRIPTION - DESCRIPTORS: DESCRIPTORS: DISCOMFORT;ACHING

## 2021-05-17 ASSESSMENT — ENCOUNTER SYMPTOMS
ABDOMINAL PAIN: 1
RESPIRATORY NEGATIVE: 1
NAUSEA: 1
VOMITING: 1

## 2021-05-17 ASSESSMENT — PAIN DESCRIPTION - FREQUENCY: FREQUENCY: CONTINUOUS

## 2021-05-17 ASSESSMENT — PAIN DESCRIPTION - PAIN TYPE
TYPE: ACUTE PAIN
TYPE: ACUTE PAIN

## 2021-05-17 NOTE — H&P
[facility]    Department of General Surgery History & Physical    PATIENT NAME: Mannie Cardenas OF BIRTH: 1935    ADMISSION DATE: 5/17/2021 11:45 AM      TODAY'S DATE: 5/17/2021    Reason for admission:  SBO      HISTORY OF PRESENT ILLNESS:              The patient is a 81 y.o. female who presents with severe, crampy abdominal pain that has been primarily in the upper abdomen. She was in the ED 5/9  She was to have colonoscopy today, but pain increased with the prep. She had associated N/V with the prep as well. She has lost weight. She has had some SOB. She is passing decreased amounts of stool and flatus. Past Medical History:    History reviewed. No pertinent past medical history. Past Surgical History:        Procedure Laterality Date    APPENDECTOMY      HYSTERECTOMY         Current Medications:   Current Facility-Administered Medications: 0.9 % sodium chloride infusion, , Intravenous, Continuous  promethazine (PHENERGAN) injection 6.25 mg, 6.25 mg, Intravenous, Q6H PRN  HYDROmorphone (DILAUDID) injection 0.5 mg, 0.5 mg, Intravenous, Q3H PRN  enoxaparin (LOVENOX) injection 40 mg, 40 mg, Subcutaneous, Q24H  famotidine (PEPCID) injection 20 mg, 20 mg, Intravenous, Daily  ondansetron (ZOFRAN) injection 4 mg, 4 mg, Intravenous, Q6H PRN  acetaminophen (TYLENOL) tablet 650 mg, 650 mg, Oral, Q6H PRN  Prior to Admission medications    Medication Sig Start Date End Date Taking? Authorizing Provider   cefdinir (OMNICEF) 300 MG capsule Take 1 capsule by mouth 2 times daily for 10 days 5/9/21 5/19/21  Kelin Portillo PA-C        Allergies:  Patient has no known allergies. Social History:   TOBACCO:   reports that she has never smoked. She has never used smokeless tobacco.  ETOH:   reports no history of alcohol use. DRUGS:   reports no history of drug use. Family History:    History reviewed. No pertinent family history.     REVIEW OF SYSTEMS:  She reports no complaints related to the eyes, ears , nose throat or mouth. She admits to weight loss. No chest pain. She has had SOB. No urinary complaints. No musculoskeletal complaints. No skin rashes. No neurologic deficits. No bleeding tendencies. GI complaints include abdominal pain. PHYSICAL EXAM:  VITALS:  BP (!) 154/64   Pulse 78   Temp 98.6 °F (37 °C) (Oral)   Resp 16   Ht 5' (1.524 m)   Wt 128 lb 3.2 oz (58.2 kg)   SpO2 98%   BMI 25.04 kg/m²     CONSTITUTIONAL:  alert, no apparent distress and thin  EYES:  sclera clear  ENT:  normocepalic, without obvious abnormality  NECK:  supple, symmetrical, trachea midline   LUNGS:  clear to auscultation  CARDIOVASCULAR:  regular rate and rhythm   ABDOMEN:  Some distention, tenderness noted mid abdomen,and soft  MUSCULOSKELETAL:  No pitting edema lower extremities  NEUROLOGIC:  Mental Status Exam:  Level of Alertness:   awake  Orientation:   person, place, time  SKIN:  no rashes    DATA:    CBC:   Recent Labs     05/17/21  1202   WBC 13.4*   HGB 13.2   HCT 39.9        BMP:    Recent Labs     05/17/21  1202   *   K 3.7   CL 87*   CO2 29   BUN 14   CREATININE 0.8   GLUCOSE 95     Hepatic:   Recent Labs     05/17/21  1202   *   *   BILITOT 1.3*   ALKPHOS 584*       Radiology Review:  CT with dilated SB and abnormal cecum, TI and ascending colon concerning for tumor. Extensive hepatic masses consistent with metastatic disease    ASSESSMENT:  Small bowel obstruction from suspected R colon CA with hepatic metasases    PLAN:  The diagnosis and recommended procedure were explained. She needs palliation from obstruction. Questions answered from the patient and her son. They understand the imaging findings and that surgery is not expected to be curative but palliative. Prepare for surgery. Greater than 50% visit spent counseling about diagnosis, treatment plan and expected post operative course.           Darius Neil MD     [facility] Surgery

## 2021-05-17 NOTE — ED PROVIDER NOTES
I independently examined and evaluated Micheal Berkowitz. All diagnostic, treatment, and disposition decisions were made by myself in conjunction with the advanced practice provider. For all further details of the patient's emergency department visit, please see the advanced practice provider's documentation. Primary Care Physician: No primary care provider on file. History: This is a 81 y.o. female who presents to the Emergency Department with complaint of generalized fatigue. Patient does have a history of metastatic colon cancer was consuming prep for upcoming colonoscopy however she cannot keep this down states that she began to vomit, states that it was dark and she brought it back up. She states that she no longer is having abdominal pain or nausea however just states that she feels ill, fatigued. Lives with son. No chest pain no shortness of breath. Physical:     height is 5' (1.524 m) and weight is 125 lb (56.7 kg). Her oral temperature is 98 °F (36.7 °C). Her blood pressure is 147/57 (abnormal) and her pulse is 83. Her respiration is 16 and oxygen saturation is 96%.    81 y.o. female   [facility] female  Heart regular rhythm  Lungs clear  Abdomen no rigidity no guarding abdomen soft    Impression: Generalized weakness and fatigue    Plan: Laboratory eval possible admission    EKG Interpretation    The Ekg interpreted by me shows  normal sinus rhythm with a rate of 85  Axis is   Normal  QTc is  normal  Intervals and Durations are unremarkable. ST Segments: Patient with scooping ST segments V4 to V6 this is seen previously on EKG dated May 9, 2021            CRITICAL CARE: There was a high probability of clinically significant/life threatening deterioration in this patient's condition which required my urgent intervention. Total critical care time was 0 minutes. This excludes any time for separately reportable procedures.        Malachi Gale DO  Emergency Physician        Comment: Please note this report has been produced using speech recognition software and may contain errors related to that system including errors in grammar, punctuation, and spelling, as well as words and phrases that may be inappropriate. If there are any questions or concerns please feel free to contact the dictating provider for clarification.           Shayy Parham,   05/17/21 8325

## 2021-05-17 NOTE — ED NOTES
Consult sent to Dr. Jena Rahman with General Surgery at [facility]  05/17/21 3820    Consult completed with call back from [address] [address] at [facility]  05/17/21 9197

## 2021-05-17 NOTE — ED NOTES
Veronika sent to Dr. Hyun Ruff at [address] [facility]  05/17/21 5788    Veronika completed with call back from [address] [place] [address] at [address] [facility]  05/17/21 7542

## 2021-05-17 NOTE — FLOWSHEET NOTE
05/17/21 1200   Encounter Summary   Services provided to: Patient and family together   Referral/Consult From: [address] [facility] Children;Family members   Continue Visiting   (5/17 Support and prayer)   Complexity of Encounter Low   Length of Encounter 15 minutes   Spiritual/Pentecostal   Type Spiritual support   Assessment Approachable; Hopeful;Peaceful   Intervention Prayer;Explored feelings, thoughts, concerns; Active listening;Sustaining presence/ Ministry of presence; Discussed relationship with God;Discussed illness/injury and it's impact   Outcome Expressed gratitude;Engaged in conversation; Shared life review; Hopeful

## 2021-05-17 NOTE — ED PROVIDER NOTES
[address] ED  EMERGENCY DEPARTMENT ENCOUNTER        Pt Name: Roxanne Rizo  MRN: [id number]  [place] 1935  Date of evaluation: 5/17/2021  Provider: Joni Mcdaniel PA-C  PCP: No primary care provider on file. Note Started: 12:44 PM EDT        I have seen and evaluated this patient with my supervising physician Lynda Sweet, [facility]       Chief Complaint   Patient presents with    Abdominal Pain     pt was due to have colonoscopy today, states she was unable to drink colon prep, pt had large amt of black emesis. also states her tongue has been bothering her, pt has white coating on tongue       HISTORY OF PRESENT ILLNESS   (Location, Timing/Onset, Context/Setting, Quality, Duration, Modifying Factors, Severity, Associated Signs and Symptoms)  Note limiting factors. Roxanne Rizo is a 81 y.o. female brought in today by private vehicle with complaints of nausea and vomiting. She reports that her vomit has been [place] in [facility]". She also reports generalized abdominal pain. Patient was recently seen here and found to have a colonic mass with liver metastasize and was offered admission however declined admission at that time. Onset of symptoms have been over the past 1 week. Duration symptoms have been persistent since onset. Context includes nausea and vomiting as well as generalized abdominal pain. She denies fevers or chills. Denies chest pain or shortness of breath. Denies urinary complaints. No aggravating symptoms. No alleviating symptoms. States she was supposed to have a colonoscopy today and was unable to drink the colonoscopy prep and called her doctor who recommended she come into the ED for further evaluation. She has not tried anything else at home. Nothing seems to make symptoms better or worse. Nursing Notes were all reviewed and agreed with or any disagreements were addressed in the HPI.     REVIEW OF SYSTEMS    (2-9 systems for level 4, 10 or more for level 5)     Review of Systems   Constitutional: Negative. HENT: Negative. Respiratory: Negative. Cardiovascular: Negative. Gastrointestinal: Positive for abdominal pain, nausea and vomiting. Genitourinary: Negative. Musculoskeletal: Negative. Skin: Negative. Neurological: Negative. Positives and Pertinent negatives as per HPI. Except as noted above in the ROS, all other systems were reviewed and negative. PAST MEDICAL HISTORY   History reviewed. No pertinent past medical history. SURGICAL HISTORY     Past Surgical History:   Procedure Laterality Date    APPENDECTOMY      HYSTERECTOMY           CURRENTMEDICATIONS       Previous Medications    CEFDINIR (OMNICEF) 300 MG CAPSULE    Take 1 capsule by mouth 2 times daily for 10 days         ALLERGIES     Patient has no known allergies. FAMILYHISTORY     History reviewed. No pertinent family history. SOCIAL HISTORY       Social History     Tobacco Use    Smoking status: Never Smoker    Smokeless tobacco: Never Used   Vaping Use    Vaping Use: Never used   Substance Use Topics    Alcohol use: Never    Drug use: Not on file       SCREENINGS             PHYSICAL EXAM    (up to 7 for level 4, 8 or more for level 5)     ED Triage Vitals [05/17/21 1204]   BP Temp Temp Source Pulse Resp SpO2 Height Weight   (!) 147/57 98 °F (36.7 °C) Oral 83 16 96 % 5' (1.524 m) 125 lb (56.7 kg)       Physical Exam  Vitals and nursing note reviewed. Exam conducted with a chaperone present. Constitutional:       General: She is awake. She is not in acute distress. Appearance: Normal appearance. She is well-developed. She is ill-appearing. She is not toxic-appearing or diaphoretic. HENT:      Head: Normocephalic and atraumatic. Nose: Nose normal.   Eyes:      General:         Right eye: No discharge. Left eye: No discharge. Cardiovascular:      Rate and Rhythm: Normal rate and regular rhythm. Pulses:           Radial pulses are 2+ on the right side and 2+ on the left side. Heart sounds: Normal heart sounds. No murmur heard. No gallop. Pulmonary:      Effort: Pulmonary effort is normal. No respiratory distress. Breath sounds: Normal breath sounds. No decreased breath sounds, wheezing, rhonchi or rales. Chest:      Chest wall: No tenderness. Abdominal:      General: Abdomen is flat. Bowel sounds are normal.      Palpations: Abdomen is soft. Tenderness: There is generalized abdominal tenderness. There is no right CVA tenderness, left CVA tenderness, guarding or rebound. Negative signs include Monroe's sign, Rovsing's sign and McBurney's sign. Genitourinary:     Rectum: Guaiac result positive. Musculoskeletal:         General: No deformity. Normal range of motion. Cervical back: Normal range of motion and neck supple. Right lower leg: No edema. Left lower leg: No edema. Skin:     General: Skin is warm and dry. Neurological:      General: No focal deficit present. Mental Status: She is alert and oriented to person, place, and time. GCS: GCS eye subscore is 4. GCS verbal subscore is 5. GCS motor subscore is 6. Psychiatric:         Behavior: Behavior normal. Behavior is cooperative.          DIAGNOSTIC RESULTS   LABS:    Labs Reviewed   CBC WITH AUTO DIFFERENTIAL - Abnormal; Notable for the following components:       Result Value    WBC 13.4 (*)     Neutrophils Absolute 10.2 (*)     All other components within normal limits    Narrative:     Performed at:  [place] [address],  [facility]   Phone [phone]   COMPREHENSIVE METABOLIC PANEL W/ REFLEX TO MG FOR LOW K - Abnormal; Notable for the following components:    Sodium 131 (*)     Chloride 87 (*)     Albumin/Globulin Ratio 0.9 (*)     Total Bilirubin 1.3 (*)     Alkaline Phosphatase 584 (*)      (*)      (*)     All other components within normal limits    Narrative:     Performed at:  [facility],  [employer]   Phone [phone]   URINE RT REFLEX TO CULTURE - Abnormal; Notable for the following components:    Clarity, UA SL CLOUDY (*)     Bilirubin Urine MODERATE (*)     Ketones, Urine 40 (*)     Blood, Urine SMALL (*)     Protein, UA TRACE (*)     Leukocyte Esterase, Urine MODERATE (*)     All other components within normal limits    Narrative:     Performed at:  [place] [address],  [employer] [place]   Phone [phone]   BLOOD OCCULT STOOL DIAGNOSTIC - Abnormal; Notable for the following components:    Occult Blood Diagnostic   (*)     Value: Result: POSITIVE  Normal range: Negative      All other components within normal limits    Narrative:     ORDER#: [id number]                          ORDERED BY: PATSY PATTON  SOURCE: Stool                              COLLECTED:  05/17/21 13:00  ANTIBIOTICS AT [place].:                      RECEIVED :  05/17/21 15:17  Performed at:  [place] [address],  [place]   Phone [phone]   MICROSCOPIC URINALYSIS - Abnormal; Notable for the following components:    Mucus, UA 1+ (*)     WBC, UA 21-50 (*)     Bacteria, UA 3+ (*)     All other components within normal limits    Narrative:     Performed at:  [facility],  [employer]   Phone [phone]   CULTURE, URINE   TROPONIN    Narrative:     Performed at:  [place] [address],  [place] [employer]   Phone [phone]   LIPASE    Narrative:     Performed at:  [facility],  [place] [employer] [employer]   Phone [phone]   PROTIME-INR    Narrative:     Performed at:  [facility]  [address] [address] [facility]  [place], [facility]   Phone [phone]   APTT    Narrative:     Performed at:  [facility]  [place],  [place], [facility]   Phone [phone]       All other labs were within normal range or not returned as of this dictation. EKG: All EKG's are interpreted by the Emergency Department Physician in the absence of a cardiologist.  Please see their note for interpretation of EKG. RADIOLOGY:   Non-plain film images such as CT, Ultrasound and MRI are read by the radiologist. Plain radiographic images are visualized and preliminarily interpreted by the ED Provider with the below findings:        Interpretation per the Radiologist below, if available at the time of this note:    CT ABDOMEN PELVIS W IV CONTRAST Additional Contrast? None   Final Result   Partial small bowel obstruction versus ileus. There is moderate dilation of   the small bowel with transition at the terminal ileum. The degree of small   bowel dilation is mildly increased since 05/09/2021. Neoplasm versus inflammation of the cecum/terminal ileum. Mural thickening,   especially the ascending colon, is persistent. Stable multiple hepatic nodules and portacaval/gastrohepatic adenopathy. Small amount of ascites, perihepatic and perienteric, perhaps slightly   increased. Stable triangular-shaped 1.5 cm nodule in the left lung base,   inflammation/atelectasis versus metastasis. XR CHEST PORTABLE   Final Result   No radiographic evidence of acute pulmonary disease. XR CHEST PORTABLE    Result Date: 5/17/2021  EXAMINATION: ONE XRAY VIEW OF THE CHEST 5/17/2021 12:12 pm COMPARISON: Chest x-ray dated 05/09/2021 HISTORY: ORDERING SYSTEM PROVIDED HISTORY: SOB TECHNOLOGIST PROVIDED HISTORY: Reason for exam:->SOB Reason for Exam: Abdominal pain Acuity: Acute Type of Exam: Initial FINDINGS: HEART/MEDIASTINUM: The cardiomediastinal silhouette is within normal limits. PLEURA/LUNGS: There are no focal consolidations or pleural effusions. There is no appreciable pneumothorax. BONES/SOFT TISSUE: No acute abnormality. No radiographic evidence of acute pulmonary disease. PROCEDURES   Unless otherwise noted below, none     Procedures    CRITICAL CARE TIME   N/A    CONSULTS:  IP CONSULT TO GENERAL SURGERY  IP CONSULT TO HOSPITALIST      EMERGENCY DEPARTMENT COURSE and DIFFERENTIAL DIAGNOSIS/MDM:   Vitals:    Vitals:    05/17/21 1204 05/17/21 1330   BP: (!) 147/57    Pulse: 83    Resp: 16    Temp: 98 °F (36.7 °C)    TempSrc: Oral    SpO2: 96% 97%   Weight: 125 lb (56.7 kg)    Height: 5' (1.524 m)        Patient was given the following medications:  Medications   nystatin (MYCOSTATIN) 123635 UNIT/ML suspension 500,000 Units (500,000 Units Oral Given 5/17/21 1325)   pantoprazole (PROTONIX) injection 40 mg (40 mg Intravenous Given 5/17/21 1326)   0.9 % sodium chloride bolus (1,000 mLs Intravenous New Bag 5/17/21 1326)   cefTRIAXone (ROCEPHIN) 1000 mg IVPB in 50 mL D5W minibag (0 mg Intravenous Stopped 5/17/21 1545)   iopamidol (ISOVUE-370) 76 % injection 75 mL (75 mLs Intravenous Given 5/17/21 1354)           Patient brought in today by private vehicle with complaints of nausea and vomiting as well as generalized abdominal pain. She did have a recent diagnosis of possible colon cancer with mets to the liver. She was seen last week had pneumonia and a urinary tract infection was offered admission however declined at that time. She is brought in today with increased pain. Patient was seen and evaluated by myself as well as my attending. On exam she is alert oriented afebrile breathing on room air satting at 96%. Nontoxic. No acute respiratory distress. Old labs records reviewed. CBC shows leukocytosis of 13.4. Hemoglobin of 13.2. Chest x-ray reveals no acute pulmonary disease. Elevated bilirubin. Elevated alk phos, ALT, AST. No acute electrolyte abnormalities. Troponin less than 0.01. And shows moderate leukocytes with 21-50 WBCs and +3 bacteria. This x-ray reveals no radiograph evidence of acute pulmonary disease. EKG reviewed by my attending see note for dictation. Given nystatin, Protonix, fluids and Rocephin. INR of 1.14. Blood occult stool is positive. T scan reveals a partial small bowel obstruction versus ileus. Moderate dilation of the small bowel with transition at the terminal ileum. The degree of small bowel dilation is mildly increased since that of 5/9/2021. Neoplasm versus inflammation of the cecum/terminal ileum. Mild thickening, this especially the ascending colon. Stable multiple hepatic nodules. Small amount of ascites, perihepatic and. Enteric perhaps slightly increased. Stable triangular-shaped 1.5 cm nodule in the left lung base, inflammation versus atelectasis versus metastases. Plan at this time will be to admit. I did consult general surgery and spoke to [employer] the physician assistant with general surgery he did recommend admission and NG placement. Patient stable at time of admission. FINAL IMPRESSION      1. Ileus ([address]    2. Colonic mass    3. Liver metastases ([address]    4. Acute cystitis with hematuria    5. Upper GI bleed    6. Elevated liver enzymes          DISPOSITION/PLAN   DISPOSITION Decision To Admit 05/17/2021 03:57:51 PM      PATIENT REFERREDTO:  No follow-up provider specified.     DISCHARGE MEDICATIONS:  New Prescriptions    No medications on file       DISCONTINUED MEDICATIONS:  Discontinued Medications    No medications on file              (Please note that portions of this note were completed with a voice recognition program.  Efforts were made to edit the dictations but occasionally words are mis-transcribed.)    Poornima El PA-C (electronically signed)            Poornima El PA-C  05/17/21 1600

## 2021-05-18 ENCOUNTER — ANESTHESIA EVENT (OUTPATIENT)
Dept: OPERATING ROOM | Age: 86
DRG: 330 | End: 2021-05-18
Payer: MEDICARE

## 2021-05-18 ENCOUNTER — ANESTHESIA (OUTPATIENT)
Dept: OPERATING ROOM | Age: 86
DRG: 330 | End: 2021-05-18
Payer: MEDICARE

## 2021-05-18 VITALS — SYSTOLIC BLOOD PRESSURE: 168 MMHG | DIASTOLIC BLOOD PRESSURE: 71 MMHG | OXYGEN SATURATION: 100 %

## 2021-05-18 LAB
ANION GAP SERPL CALCULATED.3IONS-SCNC: 11 MMOL/L (ref 3–16)
BASOPHILS ABSOLUTE: 0.1 K/UL (ref 0–0.2)
BASOPHILS RELATIVE PERCENT: 0.7 %
BUN BLDV-MCNC: 11 MG/DL (ref 7–20)
CALCIUM SERPL-MCNC: 8.4 MG/DL (ref 8.3–10.6)
CHLORIDE BLD-SCNC: 98 MMOL/L (ref 99–110)
CO2: 25 MMOL/L (ref 21–32)
CREAT SERPL-MCNC: 0.6 MG/DL (ref 0.6–1.2)
EOSINOPHILS ABSOLUTE: 0.5 K/UL (ref 0–0.6)
EOSINOPHILS RELATIVE PERCENT: 5.3 %
GFR AFRICAN AMERICAN: >60
GFR NON-AFRICAN AMERICAN: >60
GLUCOSE BLD-MCNC: 78 MG/DL (ref 70–99)
GLUCOSE BLD-MCNC: 92 MG/DL (ref 70–99)
HCT VFR BLD CALC: 34.6 % (ref 36–48)
HEMOGLOBIN: 11.4 G/DL (ref 12–16)
LYMPHOCYTES ABSOLUTE: 1.1 K/UL (ref 1–5.1)
LYMPHOCYTES RELATIVE PERCENT: 11.9 %
MAGNESIUM: 1.9 MG/DL (ref 1.8–2.4)
MCH RBC QN AUTO: 29.9 PG (ref 26–34)
MCHC RBC AUTO-ENTMCNC: 33 G/DL (ref 31–36)
MCV RBC AUTO: 90.9 FL (ref 80–100)
MONOCYTES ABSOLUTE: 1 K/UL (ref 0–1.3)
MONOCYTES RELATIVE PERCENT: 10.3 %
NEUTROPHILS ABSOLUTE: 6.8 K/UL (ref 1.7–7.7)
NEUTROPHILS RELATIVE PERCENT: 71.8 %
PDW BLD-RTO: 12.7 % (ref 12.4–15.4)
PERFORMED ON: NORMAL
PHOSPHORUS: 2.6 MG/DL (ref 2.5–4.9)
PLATELET # BLD: 262 K/UL (ref 135–450)
PMV BLD AUTO: 8.6 FL (ref 5–10.5)
POTASSIUM SERPL-SCNC: 3.6 MMOL/L (ref 3.5–5.1)
RBC # BLD: 3.8 M/UL (ref 4–5.2)
SODIUM BLD-SCNC: 134 MMOL/L (ref 136–145)
URINE CULTURE, ROUTINE: NORMAL
WBC # BLD: 9.4 K/UL (ref 4–11)

## 2021-05-18 PROCEDURE — 88307 TISSUE EXAM BY PATHOLOGIST: CPT

## 2021-05-18 PROCEDURE — 2720000010 HC SURG SUPPLY STERILE: Performed by: SURGERY

## 2021-05-18 PROCEDURE — 2580000003 HC RX 258: Performed by: SURGERY

## 2021-05-18 PROCEDURE — 88309 TISSUE EXAM BY PATHOLOGIST: CPT

## 2021-05-18 PROCEDURE — 6360000002 HC RX W HCPCS: Performed by: NURSE ANESTHETIST, CERTIFIED REGISTERED

## 2021-05-18 PROCEDURE — 2500000003 HC RX 250 WO HCPCS

## 2021-05-18 PROCEDURE — 1200000000 HC SEMI PRIVATE

## 2021-05-18 PROCEDURE — 2500000003 HC RX 250 WO HCPCS: Performed by: SURGERY

## 2021-05-18 PROCEDURE — 6370000000 HC RX 637 (ALT 250 FOR IP): Performed by: SURGERY

## 2021-05-18 PROCEDURE — 2709999900 HC NON-CHARGEABLE SUPPLY: Performed by: SURGERY

## 2021-05-18 PROCEDURE — 88342 IMHCHEM/IMCYTCHM 1ST ANTB: CPT

## 2021-05-18 PROCEDURE — 3600000004 HC SURGERY LEVEL 4 BASE: Performed by: SURGERY

## 2021-05-18 PROCEDURE — 7100000000 HC PACU RECOVERY - FIRST 15 MIN: Performed by: SURGERY

## 2021-05-18 PROCEDURE — 84100 ASSAY OF PHOSPHORUS: CPT

## 2021-05-18 PROCEDURE — 0FB00ZX EXCISION OF LIVER, OPEN APPROACH, DIAGNOSTIC: ICD-10-PCS | Performed by: SURGERY

## 2021-05-18 PROCEDURE — C1892 INTRO/SHEATH,FIXED,PEEL-AWAY: HCPCS | Performed by: SURGERY

## 2021-05-18 PROCEDURE — 6360000002 HC RX W HCPCS: Performed by: SURGERY

## 2021-05-18 PROCEDURE — 3600000014 HC SURGERY LEVEL 4 ADDTL 15MIN: Performed by: SURGERY

## 2021-05-18 PROCEDURE — 99221 1ST HOSP IP/OBS SF/LOW 40: CPT | Performed by: NURSE PRACTITIONER

## 2021-05-18 PROCEDURE — 2500000003 HC RX 250 WO HCPCS: Performed by: NURSE ANESTHETIST, CERTIFIED REGISTERED

## 2021-05-18 PROCEDURE — 47100 WEDGE BIOPSY OF LIVER: CPT | Performed by: SURGERY

## 2021-05-18 PROCEDURE — 7100000001 HC PACU RECOVERY - ADDTL 15 MIN: Performed by: SURGERY

## 2021-05-18 PROCEDURE — 36415 COLL VENOUS BLD VENIPUNCTURE: CPT

## 2021-05-18 PROCEDURE — 3700000000 HC ANESTHESIA ATTENDED CARE: Performed by: SURGERY

## 2021-05-18 PROCEDURE — 80048 BASIC METABOLIC PNL TOTAL CA: CPT

## 2021-05-18 PROCEDURE — 85025 COMPLETE CBC W/AUTO DIFF WBC: CPT

## 2021-05-18 PROCEDURE — 6360000002 HC RX W HCPCS: Performed by: ANESTHESIOLOGY

## 2021-05-18 PROCEDURE — 44160 REMOVAL OF COLON: CPT | Performed by: SURGERY

## 2021-05-18 PROCEDURE — 0DTF0ZZ RESECTION OF RIGHT LARGE INTESTINE, OPEN APPROACH: ICD-10-PCS | Performed by: SURGERY

## 2021-05-18 PROCEDURE — 3700000001 HC ADD 15 MINUTES (ANESTHESIA): Performed by: SURGERY

## 2021-05-18 PROCEDURE — 88341 IMHCHEM/IMCYTCHM EA ADD ANTB: CPT

## 2021-05-18 PROCEDURE — 2580000003 HC RX 258: Performed by: NURSE ANESTHETIST, CERTIFIED REGISTERED

## 2021-05-18 PROCEDURE — C2626 INFUSION PUMP, NON-PROG,TEMP: HCPCS | Performed by: SURGERY

## 2021-05-18 PROCEDURE — 83735 ASSAY OF MAGNESIUM: CPT

## 2021-05-18 RX ORDER — SODIUM CHLORIDE, SODIUM LACTATE, POTASSIUM CHLORIDE, CALCIUM CHLORIDE 600; 310; 30; 20 MG/100ML; MG/100ML; MG/100ML; MG/100ML
INJECTION, SOLUTION INTRAVENOUS CONTINUOUS PRN
Status: DISCONTINUED | OUTPATIENT
Start: 2021-05-18 | End: 2021-05-18 | Stop reason: SDUPTHER

## 2021-05-18 RX ORDER — MAGNESIUM HYDROXIDE 1200 MG/15ML
LIQUID ORAL CONTINUOUS PRN
Status: COMPLETED | OUTPATIENT
Start: 2021-05-18 | End: 2021-05-18

## 2021-05-18 RX ORDER — LIDOCAINE HYDROCHLORIDE 20 MG/ML
INJECTION, SOLUTION INFILTRATION; PERINEURAL PRN
Status: DISCONTINUED | OUTPATIENT
Start: 2021-05-18 | End: 2021-05-18 | Stop reason: SDUPTHER

## 2021-05-18 RX ORDER — OXYCODONE HYDROCHLORIDE AND ACETAMINOPHEN 5; 325 MG/1; MG/1
1 TABLET ORAL PRN
Status: DISCONTINUED | OUTPATIENT
Start: 2021-05-18 | End: 2021-05-18 | Stop reason: HOSPADM

## 2021-05-18 RX ORDER — MEPERIDINE HYDROCHLORIDE 25 MG/ML
12.5 INJECTION INTRAMUSCULAR; INTRAVENOUS; SUBCUTANEOUS EVERY 5 MIN PRN
Status: DISCONTINUED | OUTPATIENT
Start: 2021-05-18 | End: 2021-05-18 | Stop reason: HOSPADM

## 2021-05-18 RX ORDER — DIPHENHYDRAMINE HYDROCHLORIDE 50 MG/ML
12.5 INJECTION INTRAMUSCULAR; INTRAVENOUS
Status: DISCONTINUED | OUTPATIENT
Start: 2021-05-18 | End: 2021-05-18 | Stop reason: HOSPADM

## 2021-05-18 RX ORDER — MORPHINE SULFATE 10 MG/ML
2 INJECTION, SOLUTION INTRAMUSCULAR; INTRAVENOUS EVERY 5 MIN PRN
Status: DISCONTINUED | OUTPATIENT
Start: 2021-05-18 | End: 2021-05-18 | Stop reason: HOSPADM

## 2021-05-18 RX ORDER — OXYCODONE HYDROCHLORIDE AND ACETAMINOPHEN 5; 325 MG/1; MG/1
2 TABLET ORAL PRN
Status: DISCONTINUED | OUTPATIENT
Start: 2021-05-18 | End: 2021-05-18 | Stop reason: HOSPADM

## 2021-05-18 RX ORDER — ONDANSETRON 2 MG/ML
4 INJECTION INTRAMUSCULAR; INTRAVENOUS PRN
Status: DISCONTINUED | OUTPATIENT
Start: 2021-05-18 | End: 2021-05-18 | Stop reason: HOSPADM

## 2021-05-18 RX ORDER — FENTANYL CITRATE 50 UG/ML
INJECTION, SOLUTION INTRAMUSCULAR; INTRAVENOUS PRN
Status: DISCONTINUED | OUTPATIENT
Start: 2021-05-18 | End: 2021-05-18 | Stop reason: SDUPTHER

## 2021-05-18 RX ORDER — CEFTRIAXONE 1 G/1
INJECTION, POWDER, FOR SOLUTION INTRAMUSCULAR; INTRAVENOUS
Status: DISCONTINUED
Start: 2021-05-18 | End: 2021-05-18

## 2021-05-18 RX ORDER — ROCURONIUM BROMIDE 10 MG/ML
INJECTION, SOLUTION INTRAVENOUS PRN
Status: DISCONTINUED | OUTPATIENT
Start: 2021-05-18 | End: 2021-05-18 | Stop reason: SDUPTHER

## 2021-05-18 RX ORDER — LABETALOL HYDROCHLORIDE 5 MG/ML
5 INJECTION, SOLUTION INTRAVENOUS EVERY 10 MIN PRN
Status: DISCONTINUED | OUTPATIENT
Start: 2021-05-18 | End: 2021-05-18 | Stop reason: HOSPADM

## 2021-05-18 RX ORDER — BUPIVACAINE HYDROCHLORIDE 5 MG/ML
INJECTION, SOLUTION EPIDURAL; INTRACAUDAL PRN
Status: DISCONTINUED | OUTPATIENT
Start: 2021-05-18 | End: 2021-05-18 | Stop reason: ALTCHOICE

## 2021-05-18 RX ORDER — ONDANSETRON 2 MG/ML
INJECTION INTRAMUSCULAR; INTRAVENOUS PRN
Status: DISCONTINUED | OUTPATIENT
Start: 2021-05-18 | End: 2021-05-18 | Stop reason: SDUPTHER

## 2021-05-18 RX ORDER — MORPHINE SULFATE 10 MG/ML
1 INJECTION, SOLUTION INTRAMUSCULAR; INTRAVENOUS EVERY 5 MIN PRN
Status: DISCONTINUED | OUTPATIENT
Start: 2021-05-18 | End: 2021-05-18 | Stop reason: HOSPADM

## 2021-05-18 RX ORDER — HYDRALAZINE HYDROCHLORIDE 20 MG/ML
5 INJECTION INTRAMUSCULAR; INTRAVENOUS EVERY 10 MIN PRN
Status: DISCONTINUED | OUTPATIENT
Start: 2021-05-18 | End: 2021-05-18 | Stop reason: HOSPADM

## 2021-05-18 RX ORDER — PROMETHAZINE HYDROCHLORIDE 25 MG/ML
6.25 INJECTION, SOLUTION INTRAMUSCULAR; INTRAVENOUS
Status: DISCONTINUED | OUTPATIENT
Start: 2021-05-18 | End: 2021-05-18 | Stop reason: HOSPADM

## 2021-05-18 RX ORDER — PROPOFOL 10 MG/ML
INJECTION, EMULSION INTRAVENOUS PRN
Status: DISCONTINUED | OUTPATIENT
Start: 2021-05-18 | End: 2021-05-18 | Stop reason: SDUPTHER

## 2021-05-18 RX ORDER — ACETAMINOPHEN 650 MG/1
650 SUPPOSITORY RECTAL EVERY 6 HOURS PRN
Status: DISCONTINUED | OUTPATIENT
Start: 2021-05-18 | End: 2021-05-23 | Stop reason: HOSPADM

## 2021-05-18 RX ADMIN — ONDANSETRON HYDROCHLORIDE 4 MG: 2 INJECTION, SOLUTION INTRAMUSCULAR; INTRAVENOUS at 08:33

## 2021-05-18 RX ADMIN — SODIUM CHLORIDE, POTASSIUM CHLORIDE, SODIUM LACTATE AND CALCIUM CHLORIDE: 600; 310; 30; 20 INJECTION, SOLUTION INTRAVENOUS at 13:02

## 2021-05-18 RX ADMIN — ONDANSETRON 4 MG: 2 INJECTION INTRAMUSCULAR; INTRAVENOUS at 15:16

## 2021-05-18 RX ADMIN — METRONIDAZOLE 500 MG: 500 INJECTION, SOLUTION INTRAVENOUS at 12:41

## 2021-05-18 RX ADMIN — SODIUM CHLORIDE, POTASSIUM CHLORIDE, SODIUM LACTATE AND CALCIUM CHLORIDE: 600; 310; 30; 20 INJECTION, SOLUTION INTRAVENOUS at 13:49

## 2021-05-18 RX ADMIN — SODIUM CHLORIDE: 9 INJECTION, SOLUTION INTRAVENOUS at 16:14

## 2021-05-18 RX ADMIN — HYDROMORPHONE HYDROCHLORIDE 0.5 MG: 1 INJECTION, SOLUTION INTRAMUSCULAR; INTRAVENOUS; SUBCUTANEOUS at 02:01

## 2021-05-18 RX ADMIN — ROCURONIUM BROMIDE 10 MG: 10 INJECTION, SOLUTION INTRAVENOUS at 13:41

## 2021-05-18 RX ADMIN — METRONIDAZOLE 500 MG: 500 INJECTION, SOLUTION INTRAVENOUS at 20:36

## 2021-05-18 RX ADMIN — FENTANYL CITRATE 50 MCG: 50 INJECTION INTRAMUSCULAR; INTRAVENOUS at 13:33

## 2021-05-18 RX ADMIN — HYDROMORPHONE HYDROCHLORIDE 0.5 MG: 1 INJECTION, SOLUTION INTRAMUSCULAR; INTRAVENOUS; SUBCUTANEOUS at 23:45

## 2021-05-18 RX ADMIN — FENTANYL CITRATE 25 MCG: 50 INJECTION INTRAMUSCULAR; INTRAVENOUS at 13:27

## 2021-05-18 RX ADMIN — ROCURONIUM BROMIDE 50 MG: 10 INJECTION, SOLUTION INTRAVENOUS at 13:07

## 2021-05-18 RX ADMIN — Medication 1 SPRAY: at 23:12

## 2021-05-18 RX ADMIN — SUGAMMADEX 200 MG: 100 INJECTION, SOLUTION INTRAVENOUS at 14:23

## 2021-05-18 RX ADMIN — HYDROMORPHONE HYDROCHLORIDE 0.5 MG: 1 INJECTION, SOLUTION INTRAMUSCULAR; INTRAVENOUS; SUBCUTANEOUS at 20:35

## 2021-05-18 RX ADMIN — HYDROMORPHONE HYDROCHLORIDE 0.5 MG: 1 INJECTION, SOLUTION INTRAMUSCULAR; INTRAVENOUS; SUBCUTANEOUS at 15:16

## 2021-05-18 RX ADMIN — HYDROMORPHONE HYDROCHLORIDE 0.5 MG: 1 INJECTION, SOLUTION INTRAMUSCULAR; INTRAVENOUS; SUBCUTANEOUS at 08:33

## 2021-05-18 RX ADMIN — ONDANSETRON HYDROCHLORIDE 4 MG: 2 INJECTION, SOLUTION INTRAMUSCULAR; INTRAVENOUS at 16:13

## 2021-05-18 RX ADMIN — ENOXAPARIN SODIUM 40 MG: 40 INJECTION SUBCUTANEOUS at 20:35

## 2021-05-18 RX ADMIN — CEFTRIAXONE SODIUM 1000 MG: 1 INJECTION, POWDER, FOR SOLUTION INTRAMUSCULAR; INTRAVENOUS at 13:15

## 2021-05-18 RX ADMIN — ONDANSETRON HYDROCHLORIDE 4 MG: 2 INJECTION, SOLUTION INTRAMUSCULAR; INTRAVENOUS at 02:01

## 2021-05-18 RX ADMIN — ONDANSETRON 4 MG: 2 INJECTION, SOLUTION INTRAMUSCULAR; INTRAVENOUS at 14:23

## 2021-05-18 RX ADMIN — ONDANSETRON HYDROCHLORIDE 4 MG: 2 INJECTION, SOLUTION INTRAMUSCULAR; INTRAVENOUS at 23:12

## 2021-05-18 RX ADMIN — HYDROMORPHONE HYDROCHLORIDE 0.5 MG: 1 INJECTION, SOLUTION INTRAMUSCULAR; INTRAVENOUS; SUBCUTANEOUS at 16:59

## 2021-05-18 RX ADMIN — FENTANYL CITRATE 25 MCG: 50 INJECTION INTRAMUSCULAR; INTRAVENOUS at 13:41

## 2021-05-18 RX ADMIN — LIDOCAINE HYDROCHLORIDE 10 MG: 20 INJECTION, SOLUTION INFILTRATION; PERINEURAL at 13:07

## 2021-05-18 RX ADMIN — BUPIVACAINE HYDROCHLORIDE 270 ML: 5 INJECTION, SOLUTION EPIDURAL; INTRACAUDAL; PERINEURAL at 14:29

## 2021-05-18 RX ADMIN — PROPOFOL 70 MG: 10 INJECTION, EMULSION INTRAVENOUS at 13:07

## 2021-05-18 RX ADMIN — SODIUM CHLORIDE: 9 INJECTION, SOLUTION INTRAVENOUS at 02:03

## 2021-05-18 RX ADMIN — FAMOTIDINE 20 MG: 10 INJECTION, SOLUTION INTRAVENOUS at 20:35

## 2021-05-18 ASSESSMENT — PAIN - FUNCTIONAL ASSESSMENT
PAIN_FUNCTIONAL_ASSESSMENT: PREVENTS OR INTERFERES SOME ACTIVE ACTIVITIES AND ADLS
PAIN_FUNCTIONAL_ASSESSMENT: PREVENTS OR INTERFERES SOME ACTIVE ACTIVITIES AND ADLS

## 2021-05-18 ASSESSMENT — PULMONARY FUNCTION TESTS
PIF_VALUE: 19
PIF_VALUE: 15
PIF_VALUE: 19
PIF_VALUE: 15
PIF_VALUE: 14
PIF_VALUE: 18
PIF_VALUE: 3
PIF_VALUE: 14
PIF_VALUE: 14
PIF_VALUE: 15
PIF_VALUE: 15
PIF_VALUE: 18
PIF_VALUE: 2
PIF_VALUE: 1
PIF_VALUE: 8
PIF_VALUE: 19
PIF_VALUE: 2
PIF_VALUE: 15
PIF_VALUE: 16
PIF_VALUE: 18
PIF_VALUE: 15
PIF_VALUE: 14
PIF_VALUE: 19
PIF_VALUE: 15
PIF_VALUE: 15
PIF_VALUE: 17
PIF_VALUE: 15
PIF_VALUE: 18
PIF_VALUE: 20
PIF_VALUE: 15
PIF_VALUE: 3
PIF_VALUE: 19
PIF_VALUE: 18
PIF_VALUE: 18
PIF_VALUE: 16
PIF_VALUE: 17
PIF_VALUE: 15
PIF_VALUE: 19
PIF_VALUE: 18
PIF_VALUE: 15
PIF_VALUE: 1
PIF_VALUE: 15
PIF_VALUE: 15
PIF_VALUE: 14
PIF_VALUE: 0
PIF_VALUE: 15
PIF_VALUE: 15
PIF_VALUE: 9
PIF_VALUE: 1
PIF_VALUE: 14
PIF_VALUE: 18

## 2021-05-18 ASSESSMENT — PAIN DESCRIPTION - LOCATION
LOCATION: ABDOMEN
LOCATION: ABDOMEN

## 2021-05-18 ASSESSMENT — PAIN SCALES - WONG BAKER: WONGBAKER_NUMERICALRESPONSE: 8

## 2021-05-18 ASSESSMENT — PAIN DESCRIPTION - ONSET
ONSET: ON-GOING

## 2021-05-18 ASSESSMENT — PAIN DESCRIPTION - PAIN TYPE
TYPE: SURGICAL PAIN
TYPE: SURGICAL PAIN
TYPE: ACUTE PAIN
TYPE: SURGICAL PAIN

## 2021-05-18 ASSESSMENT — PAIN DESCRIPTION - FREQUENCY
FREQUENCY: CONTINUOUS
FREQUENCY: CONTINUOUS

## 2021-05-18 ASSESSMENT — PAIN SCALES - GENERAL
PAINLEVEL_OUTOF10: 8
PAINLEVEL_OUTOF10: 7
PAINLEVEL_OUTOF10: 9
PAINLEVEL_OUTOF10: 8
PAINLEVEL_OUTOF10: 0
PAINLEVEL_OUTOF10: 7
PAINLEVEL_OUTOF10: 0

## 2021-05-18 ASSESSMENT — PAIN DESCRIPTION - DESCRIPTORS
DESCRIPTORS: DISCOMFORT;ACHING

## 2021-05-18 ASSESSMENT — PAIN DESCRIPTION - PROGRESSION: CLINICAL_PROGRESSION: NOT CHANGED

## 2021-05-18 NOTE — PLAN OF CARE
Problem: Infection:  Goal: Will remain free from infection  Description: Will remain free from infection  5/18/2021 1939 by Pam Dominguez RN  Outcome: Ongoing  5/18/2021 1903 by Jose D Kline RN  Outcome: Ongoing     Problem: Safety:  Goal: Free from accidental physical injury  Description: Free from accidental physical injury  Outcome: Ongoing  Goal: Free from intentional harm  Description: Free from intentional harm  Outcome: Ongoing     Problem: Daily Care:  Goal: Daily care needs are met  Description: Daily care needs are met  Outcome: Ongoing     Problem: Pain:  Goal: Patient's pain/discomfort is manageable  Description: Patient's pain/discomfort is manageable  5/18/2021 1939 by Pam Dominguez RN  Outcome: Ongoing  5/18/2021 1903 by Jose D Kline RN  Outcome: Ongoing     Problem: Skin Integrity:  Goal: Skin integrity will stabilize  Description: Skin integrity will stabilize  Outcome: Ongoing     Problem: Falls - Risk of:  Goal: Will remain free from falls  Description: Will remain free from falls  Outcome: Ongoing  Goal: Absence of physical injury  Description: Absence of physical injury  Outcome: Ongoing

## 2021-05-18 NOTE — CONSULTS
Constitutional: Negative for fever   Respiratory: Negative  for dyspnea, cough   Cardiovascular: Negative for chest pain   Gastrointestinal:  + abdominal pain, nausea, vomiting  Genitourinary: Negative for hematuria   Musculoskeletal: Negative for arthralgias   Skin: Negative for rash   Neurological: Negative for syncope   Hematological: Negative for adenopathy   Psychiatric/Behavorial: Negative for anxiety    PHYSICAL EXAM:    BP (!) 117/45   Pulse 65   Temp 97.5 °F (36.4 °C) (Infrared)   Resp 16   Ht 5' (1.524 m)   Wt 128 lb 3.2 oz (58.2 kg)   SpO2 96%   BMI 25.04 kg/m²     Gen: No distress. Alert. Eyes: PERRL. No sclera icterus. No conjunctival injection. ENT: No discharge. Pharynx clear. NGT  Neck: Trachea midline. Resp: No accessory muscle use. No crackles. No wheezes. No rhonchi. CV: Regular rate. Regular rhythm. No murmur. No rub. No edema. GI: Non-tender. Non-distended. Normal bowel sounds. No hernia. Surgical dressing. Skin: Warm and dry. No nodule on exposed extremities. No rash on exposed extremities. M/S: No cyanosis. No joint deformity. No clubbing. Neuro: Awake. Grossly nonfocal    Psych: Oriented x 3. No anxiety or agitation.      CBC:   Recent Labs     05/17/21  1202 05/18/21  0532   WBC 13.4* 9.4   HGB 13.2 11.4*   HCT 39.9 34.6*   MCV 90.3 90.9    262     BMP:   Recent Labs     05/17/21  1202 05/18/21  0532   * 134*   K 3.7 3.6   CL 87* 98*   CO2 29 25   PHOS  --  2.6   BUN 14 11   CREATININE 0.8 0.6     LIVER PROFILE:   Recent Labs     05/17/21  1202   *   *   LIPASE 34.0   BILITOT 1.3*   ALKPHOS 584*     PT/INR:   Recent Labs     05/17/21  1300   PROTIME 13.2   INR 1.14     APTT:   Recent Labs     05/17/21  1300   APTT 27.0     UA:  Recent Labs     05/17/21  1202   COLORU Yellow   PHUR 6.0   WBCUA 21-50*   RBCUA 3-4   MUCUS 1+*   BACTERIA 3+*   CLARITYU SL CLOUDY*   SPECGRAV 1.025   LEUKOCYTESUR MODERATE*   UROBILINOGEN 0.2   BILIRUBINUR MODERATE*   BLOODU SMALL*   GLUCOSEU Negative         CARDIAC ENZYMES  Recent Labs     05/17/21  1202   TROPONINI <0.01       CULTURES  Urine: NGTD  COVID: not detected    EKG:  I have reviewed the EKG with the following interpretation:   Sinus rhythm with Premature atrial complexes, Nonspecific ST and T wave abnormality, Abnormal ECG Except for PAC no significant changes since 5.9.21    RADIOLOGY  CT ABDOMEN PELVIS W IV CONTRAST Additional Contrast? None   Final Result   Partial small bowel obstruction versus ileus. There is moderate dilation of   the small bowel with transition at the terminal ileum. The degree of small   bowel dilation is mildly increased since 05/09/2021. Neoplasm versus inflammation of the cecum/terminal ileum. Mural thickening,   especially the ascending colon, is persistent. Stable multiple hepatic nodules and portacaval/gastrohepatic adenopathy. Small amount of ascites, perihepatic and perienteric, perhaps slightly   increased. Stable triangular-shaped 1.5 cm nodule in the left lung base,   inflammation/atelectasis versus metastasis. XR CHEST PORTABLE   Final Result   No radiographic evidence of acute pulmonary disease. Active Problems:    Small bowel obstruction (HCC)  Resolved Problems:    * No resolved hospital problems. *        ASSESSMENT/PLAN:  Partial SBO  Cecal mass  - admitted to med-surg. Management per general surgery  - NPO, IVF's  - S/p right colectomy, liver biopsy  - pain control: Dilaudidprn  - NG Tube to wall suction    Hyponatremia  - due to fluid volume depletion  - improved with IVF's. Elevated LFT's  - will repeat LFT's.     Leukocytosis  - 13.4-->9.4  - improved on repeat. Hepatic nodules  Pulmonary nodule   - workup as above. Pathology pending  - may need pulm consult.    - liver biopsy done    DVT Prophylaxis: Lovenox  Diet: Diet NPO Effective Now Exceptions are: Ice Chips, Sips with Meds  Code Status: Full Ruth Ann Cesar FNP-C  5/18/2021

## 2021-05-18 NOTE — ANESTHESIA PRE PROCEDURE
Department of Anesthesiology  Preprocedure Note       Name:  Radha Mensah   Age:  81 y.o.  :  1935                                          MRN:  [id number]         Date:  2021      Surgeon: Aaron Christie):  Nancy Kat MD    Procedure: Procedure(s):  RIGHT COLECTOMY    Medications prior to admission:   Prior to Admission medications    Medication Sig Start Date End Date Taking? Authorizing Provider   cefdinir (OMNICEF) 300 MG capsule Take 1 capsule by mouth 2 times daily for 10 days 21  Aliyah Boyle PA-C       Current medications:    Current Facility-Administered Medications   Medication Dose Route Frequency Provider Last Rate Last Admin    0.9 % sodium chloride infusion   Intravenous Continuous Nancy Kat MD 75 mL/hr at 21 0203 New Bag at 21 0203    promethazine (PHENERGAN) injection 6.25 mg  6.25 mg Intravenous Q6H PRN Nancy Kat MD        HYDROmorphone (DILAUDID) injection 0.5 mg  0.5 mg Intravenous Q3H PRN Nancy Kat MD   0.5 mg at 21 0688    enoxaparin (LOVENOX) injection 40 mg  40 mg Subcutaneous Q24H Nancy Kat MD   40 mg at 21    famotidine (PEPCID) injection 20 mg  20 mg Intravenous Daily Nancy Kat MD   20 mg at 21    [facility]etron [facility]) [facility] 4 mg  4 mg Intravenous Q6H PRN Nancy Kat MD   4 mg at 21 0450    acetaminophen (TYLENOL) tablet 650 mg  650 mg Oral Q6H PRN Nancy Kat MD           Allergies:  No Known Allergies    Problem List:    Patient Active Problem List   Diagnosis Code    Small bowel obstruction ([facility] [address] Z92.429       Past Medical History:  History reviewed. No pertinent past medical history.     Past Surgical History:        Procedure Laterality Date    APPENDECTOMY      HYSTERECTOMY         Social History:    Social History     Tobacco Use    Smoking status: Never Smoker    Smokeless tobacco: Never Used   Substance Use Topics    Alcohol use: Never                                Counseling given: Not Answered      Vital Signs (Current):   Vitals:    05/17/21 2209 05/18/21 0045 05/18/21 0453 05/18/21 0836   BP: (!) 160/60 139/83 116/65 (!) 160/71   Pulse: 82 75 72 79   Resp: 16 16 16 16   Temp: 97.4 °F (36.3 °C) 98.4 °F (36.9 °C) 98.2 °F (36.8 °C) 97.7 °F (36.5 °C)   TempSrc: Oral Oral Oral Oral   SpO2: 98% 94% 94% 95%   Weight:       Height:                                                  BP Readings from Last 3 Encounters:   05/18/21 (!) 160/71   05/09/21 (!) 155/84       NPO Status: Time of last liquid consumption: 0000                        Time of last solid consumption: 0000                        Date of last liquid consumption: 05/18/21                        Date of last solid food consumption: 05/18/21    BMI:   Wt Readings from Last 3 Encounters:   05/17/21 128 lb 3.2 oz (58.2 kg)   05/09/21 125 lb (56.7 kg)     Body mass index is 25.04 kg/m². CBC:   Lab Results   Component Value Date    WBC 9.4 05/18/2021    RBC 3.80 05/18/2021    HGB 11.4 05/18/2021    HCT 34.6 05/18/2021    MCV 90.9 05/18/2021    RDW 12.7 05/18/2021     05/18/2021       CMP:   Lab Results   Component Value Date     05/18/2021    K 3.6 05/18/2021    K 3.7 05/17/2021    CL 98 05/18/2021    CO2 25 05/18/2021    BUN 11 05/18/2021    CREATININE 0.6 05/18/2021    GFRAA >60 05/18/2021    AGRATIO 0.9 05/17/2021    LABGLOM >60 05/18/2021    GLUCOSE 78 05/18/2021    PROT 8.2 05/17/2021    CALCIUM 8.4 05/18/2021    BILITOT 1.3 05/17/2021    ALKPHOS 584 05/17/2021     05/17/2021     05/17/2021       POC Tests: No results for input(s): POCGLU, POCNA, POCK, POCCL, POCBUN, POCHEMO, POCHCT in the last 72 hours.     Coags:   Lab Results   Component Value Date    PROTIME 13.2 05/17/2021    INR 1.14 05/17/2021    APTT 27.0 05/17/2021       HCG (If Applicable): No results found for: PREGTESTUR, PREGSERUM, HCG, HCGQUANT     ABGs: No results found for: PHART, PO2ART, SBT4RLE, IAM3QYJ, BEART, K4NCCTHH     Type & Screen (If Applicable):  No results found for: LABABO, LABRH    Drug/Infectious Status (If Applicable):  No results found for: HIV, HEPCAB    COVID-19 Screening (If Applicable):   Lab Results   Component Value Date    COVID19 Not Detected 05/17/2021           Anesthesia Evaluation  Patient summary reviewed and Nursing notes reviewed  Airway: Mallampati: II  TM distance: >3 FB   Neck ROM: limited  Mouth opening: > = 3 FB and < 3 FB Dental: normal exam         Pulmonary:Negative Pulmonary ROS and normal exam  breath sounds clear to auscultation                             Cardiovascular:Negative CV ROS            Rhythm: regular  Rate: normal                    Neuro/Psych:   Negative Neuro/Psych ROS              GI/Hepatic/Renal:            ROS comment: Small bowel obstruction. Endo/Other: Negative Endo/Other ROS                    Abdominal:           Vascular: negative vascular ROS. Anesthesia Plan      general     ASA 2       Induction: intravenous. MIPS: Postoperative opioids intended and Prophylactic antiemetics administered. Anesthetic plan and risks discussed with patient. Plan discussed with CRNA.                   Terry Wang MD   5/18/2021

## 2021-05-18 NOTE — PROGRESS NOTES
Received from PACU sleepy but arousable. Family at bedside. Pt c/o nausea, zofran given. NGT placed to LCWS as ordered. SHARATH drain intact to bulb suction. Call light within reach. Bed alarm on. Will continue to monitor.

## 2021-05-18 NOTE — FLOWSHEET NOTE
Jennifer, pt returned from surgery as  was visiting with Gertrude Branchpe, elissa Evans walked in a few minutes after. Supportive sons lovingly caring for patient; patient was resistant to being treated, but was glad to have same doctor in ED that cared for her late . Sons stated that seeing this doctor was helpful for her. Prayer appreciated by family, patient is open to [address] visits, was encouraged by visit from [place] earlier in the week. Fabiana Loco  [phone]       05/18/21 1640   Encounter Summary   Services provided to: Family  (Son Lakisha Evans (PCG), Son Gertrude Kruse)   Referral/Consult From: Jennifer   Continue Visiting   (5/18: pt returned from surgery, disc pt care, ann)   Complexity of Encounter Moderate   Length of Encounter 30 minutes   Spiritual Assessment Completed Yes   Spiritual/Anabaptist   Type Spiritual support   Assessment Approachable; Hopeful   Intervention Nurtured hope; Active listening;Discussed relationship with God;Discussed meaning/purpose  (pt believes illness 'isn't God's will,' was diff to convince)   Outcome Expressed gratitude;Comfort

## 2021-05-18 NOTE — PROGRESS NOTES
Consent for surgery signed, chlorhexadine wipes to trunk done. Clothing removed. To OR via bed. Son at side.

## 2021-05-18 NOTE — PLAN OF CARE
Problem: Infection:  Goal: Will remain free from infection  Description: Will remain free from infection  Outcome: Ongoing     Problem: Pain:  Goal: Patient's pain/discomfort is manageable  Description: Patient's pain/discomfort is manageable  Outcome: Ongoing

## 2021-05-18 NOTE — FLOWSHEET NOTE
05/17/21 2209   Vital Signs   Temp 97.4 °F (36.3 °C)   Temp Source Oral   Pulse 82   Heart Rate Source Monitor   Resp 16   BP (!) 160/60   BP Location Left upper arm   Patient Position Semi fowlers   Level of Consciousness Alert (0)   MEWS Score 1   Oxygen Therapy   SpO2 98 %   O2 Device None (Room air)   Assessment complete- see flowsheets. Pt resting in bed, Pt has been educated to hospital falls prevention policy. They are aware they will be assessed every shift, and with any condition changes, by the nursing staff on their ability to perform their ADL's without need for assistance. Pt understands that based on the number of their score they are given a base score that will assign a level of low, medium, or high risk for falls. This patient has rated a high which requires a bed / chair alarm for their safety to prevent a fall. The patient is alert and oriented, and acknowledges and understands the need for intervention but refuses the application and use of the bed / chair alarm that is required per policy. Pt agrees to use call light and wait for help to arrive to assist them to get up when they need to. Call light is within reach and all other safety measures in place. Pt aware to be NPO after midnight. Will continue to monitor.   Roxana Encarnacion RN

## 2021-05-18 NOTE — BRIEF OP NOTE
Brief Postoperative Note      Patient: Linda Pearce  YOB: 1935  MRN: [id number]    Date of Procedure: 5/18/2021    Pre-Op Diagnosis: CECAL MASS; SBO    Post-Op Diagnosis: Same       Procedure(s):  RIGHT COLECTOMY, LIVER BIOPSY    Surgeon(s):  Garrick Gale MD    Assistant:  Surgical Assistant: Stefania Rojas    Anesthesia: General    Estimated Blood Loss (mL): 50    Complications: None    Specimens:   ID Type Source Tests Collected by Time Destination   A : RIGHT COLON, TERMINAL ILEUM Tissue Tissue SURGICAL PATHOLOGY Garrick Gale MD 5/18/2021 1346    B : LIVER NODULE Tissue Tissue SURGICAL PATHOLOGY Garrick Gale MD 5/18/2021 1407        Implants:  * No implants in log *      Drains:   Closed/Suction Drain Midline RLQ Bulb 10 Latvian (Active)   Dressing Status Clean;Dry; Intact 05/18/21 1417   Drainage Appearance Bloody 05/18/21 1417   Status Compressed 05/18/21 1417       NG/OG/NJ/NE Tube Nasogastric 16 fr Right nostril (Active)       Urethral Catheter Non-latex;Straight-tip 16 fr (Active)       Findings: As above    Electronically signed by Billy Glaser MD on 5/18/2021 at 2:46 PM

## 2021-05-19 LAB
ALBUMIN SERPL-MCNC: 2.6 G/DL (ref 3.4–5)
ALP BLD-CCNC: 329 U/L (ref 40–129)
ALT SERPL-CCNC: 95 U/L (ref 10–40)
ANION GAP SERPL CALCULATED.3IONS-SCNC: 13 MMOL/L (ref 3–16)
AST SERPL-CCNC: 109 U/L (ref 15–37)
BASOPHILS ABSOLUTE: 0.1 K/UL (ref 0–0.2)
BASOPHILS RELATIVE PERCENT: 0.5 %
BILIRUB SERPL-MCNC: 0.9 MG/DL (ref 0–1)
BILIRUBIN DIRECT: 0.7 MG/DL (ref 0–0.3)
BILIRUBIN, INDIRECT: 0.2 MG/DL (ref 0–1)
BUN BLDV-MCNC: 11 MG/DL (ref 7–20)
CALCIUM SERPL-MCNC: 7.9 MG/DL (ref 8.3–10.6)
CHLORIDE BLD-SCNC: 101 MMOL/L (ref 99–110)
CO2: 22 MMOL/L (ref 21–32)
CREAT SERPL-MCNC: 0.7 MG/DL (ref 0.6–1.2)
EOSINOPHILS ABSOLUTE: 0 K/UL (ref 0–0.6)
EOSINOPHILS RELATIVE PERCENT: 0 %
GFR AFRICAN AMERICAN: >60
GFR NON-AFRICAN AMERICAN: >60
GLUCOSE BLD-MCNC: 101 MG/DL (ref 70–99)
GLUCOSE BLD-MCNC: 102 MG/DL (ref 70–99)
GLUCOSE BLD-MCNC: 108 MG/DL (ref 70–99)
GLUCOSE BLD-MCNC: 112 MG/DL (ref 70–99)
GLUCOSE BLD-MCNC: 92 MG/DL (ref 70–99)
GLUCOSE BLD-MCNC: 99 MG/DL (ref 70–99)
HCT VFR BLD CALC: 36 % (ref 36–48)
HEMOGLOBIN: 11.7 G/DL (ref 12–16)
LYMPHOCYTES ABSOLUTE: 1.2 K/UL (ref 1–5.1)
LYMPHOCYTES RELATIVE PERCENT: 7.2 %
MAGNESIUM: 1.7 MG/DL (ref 1.8–2.4)
MCH RBC QN AUTO: 29.8 PG (ref 26–34)
MCHC RBC AUTO-ENTMCNC: 32.5 G/DL (ref 31–36)
MCV RBC AUTO: 91.7 FL (ref 80–100)
MONOCYTES ABSOLUTE: 1.5 K/UL (ref 0–1.3)
MONOCYTES RELATIVE PERCENT: 8.9 %
NEUTROPHILS ABSOLUTE: 14 K/UL (ref 1.7–7.7)
NEUTROPHILS RELATIVE PERCENT: 83.4 %
PDW BLD-RTO: 12.7 % (ref 12.4–15.4)
PERFORMED ON: ABNORMAL
PERFORMED ON: NORMAL
PERFORMED ON: NORMAL
PHOSPHORUS: 3.2 MG/DL (ref 2.5–4.9)
PLATELET # BLD: 308 K/UL (ref 135–450)
PMV BLD AUTO: 8.9 FL (ref 5–10.5)
POTASSIUM SERPL-SCNC: 3.7 MMOL/L (ref 3.5–5.1)
RBC # BLD: 3.92 M/UL (ref 4–5.2)
SODIUM BLD-SCNC: 136 MMOL/L (ref 136–145)
TOTAL PROTEIN: 5.5 G/DL (ref 6.4–8.2)
WBC # BLD: 16.7 K/UL (ref 4–11)

## 2021-05-19 PROCEDURE — 83735 ASSAY OF MAGNESIUM: CPT

## 2021-05-19 PROCEDURE — 2580000003 HC RX 258: Performed by: SURGERY

## 2021-05-19 PROCEDURE — 6360000002 HC RX W HCPCS: Performed by: SURGERY

## 2021-05-19 PROCEDURE — 84100 ASSAY OF PHOSPHORUS: CPT

## 2021-05-19 PROCEDURE — 85025 COMPLETE CBC W/AUTO DIFF WBC: CPT

## 2021-05-19 PROCEDURE — 6360000002 HC RX W HCPCS: Performed by: NURSE PRACTITIONER

## 2021-05-19 PROCEDURE — 80076 HEPATIC FUNCTION PANEL: CPT

## 2021-05-19 PROCEDURE — 6370000000 HC RX 637 (ALT 250 FOR IP): Performed by: SURGERY

## 2021-05-19 PROCEDURE — 1200000000 HC SEMI PRIVATE

## 2021-05-19 PROCEDURE — 2500000003 HC RX 250 WO HCPCS: Performed by: SURGERY

## 2021-05-19 PROCEDURE — 80048 BASIC METABOLIC PNL TOTAL CA: CPT

## 2021-05-19 PROCEDURE — 99232 SBSQ HOSP IP/OBS MODERATE 35: CPT | Performed by: INTERNAL MEDICINE

## 2021-05-19 PROCEDURE — 36415 COLL VENOUS BLD VENIPUNCTURE: CPT

## 2021-05-19 PROCEDURE — 99024 POSTOP FOLLOW-UP VISIT: CPT | Performed by: NURSE PRACTITIONER

## 2021-05-19 PROCEDURE — 2580000003 HC RX 258: Performed by: NURSE PRACTITIONER

## 2021-05-19 RX ORDER — MAGNESIUM SULFATE 1 G/100ML
1000 INJECTION INTRAVENOUS ONCE
Status: COMPLETED | OUTPATIENT
Start: 2021-05-19 | End: 2021-05-19

## 2021-05-19 RX ADMIN — MAGNESIUM SULFATE HEPTAHYDRATE 1000 MG: 1 INJECTION, SOLUTION INTRAVENOUS at 10:46

## 2021-05-19 RX ADMIN — HYDROMORPHONE HYDROCHLORIDE 0.5 MG: 1 INJECTION, SOLUTION INTRAMUSCULAR; INTRAVENOUS; SUBCUTANEOUS at 06:41

## 2021-05-19 RX ADMIN — SODIUM CHLORIDE: 9 INJECTION, SOLUTION INTRAVENOUS at 18:10

## 2021-05-19 RX ADMIN — HYDROMORPHONE HYDROCHLORIDE 0.5 MG: 1 INJECTION, SOLUTION INTRAMUSCULAR; INTRAVENOUS; SUBCUTANEOUS at 12:56

## 2021-05-19 RX ADMIN — HYDROMORPHONE HYDROCHLORIDE 0.5 MG: 1 INJECTION, SOLUTION INTRAMUSCULAR; INTRAVENOUS; SUBCUTANEOUS at 03:36

## 2021-05-19 RX ADMIN — ACETAMINOPHEN 650 MG: 650 SUPPOSITORY RECTAL at 21:14

## 2021-05-19 RX ADMIN — SODIUM CHLORIDE: 9 INJECTION, SOLUTION INTRAVENOUS at 03:36

## 2021-05-19 RX ADMIN — ENOXAPARIN SODIUM 40 MG: 40 INJECTION SUBCUTANEOUS at 21:14

## 2021-05-19 RX ADMIN — METRONIDAZOLE 500 MG: 500 INJECTION, SOLUTION INTRAVENOUS at 03:36

## 2021-05-19 RX ADMIN — HYDROMORPHONE HYDROCHLORIDE 0.5 MG: 1 INJECTION, SOLUTION INTRAMUSCULAR; INTRAVENOUS; SUBCUTANEOUS at 16:51

## 2021-05-19 RX ADMIN — ONDANSETRON HYDROCHLORIDE 4 MG: 2 INJECTION, SOLUTION INTRAMUSCULAR; INTRAVENOUS at 06:41

## 2021-05-19 RX ADMIN — FAMOTIDINE 20 MG: 10 INJECTION, SOLUTION INTRAVENOUS at 21:14

## 2021-05-19 ASSESSMENT — PAIN DESCRIPTION - PAIN TYPE
TYPE: SURGICAL PAIN
TYPE: SURGICAL PAIN

## 2021-05-19 ASSESSMENT — PAIN DESCRIPTION - LOCATION: LOCATION: ABDOMEN

## 2021-05-19 ASSESSMENT — PAIN DESCRIPTION - DESCRIPTORS: DESCRIPTORS: DISCOMFORT;ACHING

## 2021-05-19 ASSESSMENT — PAIN SCALES - GENERAL
PAINLEVEL_OUTOF10: 7
PAINLEVEL_OUTOF10: 7
PAINLEVEL_OUTOF10: 8
PAINLEVEL_OUTOF10: 3

## 2021-05-19 ASSESSMENT — PAIN DESCRIPTION - ONSET: ONSET: ON-GOING

## 2021-05-19 ASSESSMENT — PAIN DESCRIPTION - PROGRESSION: CLINICAL_PROGRESSION: NOT CHANGED

## 2021-05-19 ASSESSMENT — PAIN SCALES - WONG BAKER: WONGBAKER_NUMERICALRESPONSE: 8

## 2021-05-19 NOTE — PROGRESS NOTES
Patient is tearful and confused. Feels like she was moved to a different room and that there are children playing in the halls. Reoriented to room and current situation. Sat with patient for awhile to help reassure her. Be alarm is on, call light within reach.

## 2021-05-19 NOTE — PROGRESS NOTES
Admit: 2021    Name:  Severo Free  Room:  [facility]6/9106-17  MRN:    [id number]    Daily Progress Note for 2021     Interval History:         Scheduled Meds:   magnesium sulfate  1,000 mg Intravenous Once    enoxaparin  40 mg Subcutaneous Q24H    famotidine (PEPCID) injection  20 mg Intravenous Daily       Continuous Infusions:   bupivacaine 0.5% 270 mL (21 1429)    sodium chloride 75 mL/hr at 21 0336       PRN Meds:  acetaminophen, phenol, promethazine, HYDROmorphone, ondansetron                  Objective:     Temp  Av.8 °F (36.6 °C)  Min: 96.8 °F (36 °C)  Max: 98.6 °F (37 °C)  Pulse  Av.5  Min: 63  Max: 88  BP  Min: 93/56  Max: 179/79  SpO2  Av.3 %  Min: 93 %  Max: 100 %  FiO2   Av.3 %  Min: 82 %  Max: 99 %  Patient Vitals for the past 4 hrs:   BP Temp Temp src Pulse Resp   21 0900 (!) 128/53 98.4 °F (36.9 °C) Oral 88 16         Intake/Output Summary (Last 24 hours) at 2021 1030  Last data filed at 2021 0427  Gross per 24 hour   Intake 1898 ml   Output 800 ml   Net 1098 ml       Physical Exam:  Gen: No distress. Alert. Eyes: PERRL. No sclera icterus. No conjunctival injection. ENT: No discharge. Pharynx clear. NGT  Neck: Trachea midline. Resp: No accessory muscle use. No crackles. No wheezes. No rhonchi. CV: Regular rate. Regular rhythm. No murmur. No rub. No edema. GI: appropriately tender,. No hernia. Surgical dressing. Skin: Warm and dry. No nodule on exposed extremities. No rash on exposed extremities. M/S: No cyanosis. No joint deformity. No clubbing. Neuro: Awake. Grossly nonfocal    Psych: Oriented x 3. No anxiety or agitation.    Lab Data:  CBC:   Recent Labs     21  1202 21  0532 21  0527   WBC 13.4* 9.4 16.7*   RBC 4.42 3.80* 3.92*   HGB 13.2 11.4* 11.7*   HCT 39.9 34.6* 36.0   MCV 90.3 90.9 91.7   RDW 12.7 12.7 12.7    262 308     BMP:   Recent Labs     21  1202 21  0532 21  0527

## 2021-05-19 NOTE — PROGRESS NOTES
Awake, in bed. resp even and unlabored. Rates pain 5/10. Declines pain med at this time. States she wants to stay awake for a bit. Encouraged use of IS, assisted with repositioning. Call light within reach.

## 2021-05-19 NOTE — PLAN OF CARE
Nutrition Problem #1: Inadequate oral intake  Intervention: Food and/or Nutrient Delivery: Continue NPO  Nutritional Goals: pt will adhere to NPO order and juan becerra advanced to PO diet as her GI function returns

## 2021-05-19 NOTE — PROGRESS NOTES
Comprehensive Nutrition Assessment    Type and Reason for Visit:  Initial (MST 2)    Nutrition Recommendations/Plan:   1. Continue NPO     Nutrition Assessment:    Pt. nutritionally compromised AEB she was  admitted with a colon mass and POD #1 S/P right colectomy and liver biopsy    At risk for further nutrition compromise r/t she remain s NPO awaiting retrun of GI function . Will continue to monitor NPO status . Malnutrition Assessment:  Malnutrition Status: At risk for malnutrition (Comment)    Context:  Acute Illness     Findings of the 6 clinical characteristics of malnutrition:  Energy Intake:  No significant decrease in energy intake  Weight Loss:  Unable to assess     Body Fat Loss:  No significant body fat loss Orbital   Muscle Mass Loss:  1 - Mild muscle mass loss Temples (temporalis), Clavicles (pectoralis & deltoids)  Fluid Accumulation:  Unable to assess     Strength:  Not Performed    Estimated Daily Nutrient Needs:  Energy (kcal):  0600-2808 based ~ 20-25kcal/kg cbw; Weight Used for Energy Requirements:  Current     Protein (g):  55-64 based ~ 1.2-1.4 gr/kg ibw; Weight Used for Protein Requirements:  Ideal        Fluid (ml/day):  5337-9679; Method Used for Fluid Requirements:  1 ml/kcal      Nutrition Related Findings:  eldlery female sitting in bedside recliner;POD #1 S/P right colectomy and liver biopsy;no N/V, no flatus, no BM, SHARATH in place, + mild distention      Wounds:  Surgical Incision       Current Nutrition Therapies:    Diet NPO Effective Now Exceptions are: Ice Chips, Sips with Meds    Anthropometric Measures:  · Height: 5' (152.4 cm)  · Current Body Weight: 128 lb (58.1 kg)   · Admission Body Weight: 128 lb (58.1 kg)    · Usual Body Weight:  (unknown)     · Ideal Body Weight: 100 lbs; % Ideal Body Weight 128 %   · BMI: 25  · BMI Categories: Overweight (BMI 25.0-29. 9)       Nutrition Diagnosis:   · Inadequate oral intake related to altered GI function, altered GI structure as

## 2021-05-19 NOTE — FLOWSHEET NOTE
05/18/21 1911   Vital Signs   Temp 97.9 °F (36.6 °C)   Temp Source Oral   Pulse 78   Heart Rate Source Monitor   Resp 16   BP (!) 116/59   BP Location Left upper arm   Patient Position Semi fowlers   Level of Consciousness Alert (0)   MEWS Score 1   Oxygen Therapy   SpO2 95 %   O2 Device None (Room air)   Assessment complete, see flowsheets. Pt resting in bed at this time, PRN pain medication given earlier this shift per pt request within PRN order parameters. Yen secured and draining, SHARATH to bulb suction, NG to R nare to CLWS, midline incision has surgical dressing in place with no drainage noted. Call light within reach, bed alarm in place, pt aware to call for PRN medication needs. Will continue to monitor.   Paulette Wells RN

## 2021-05-19 NOTE — ACP (ADVANCE CARE PLANNING)
Advance Care Planning   Healthcare Decision Maker:    Primary Decision Maker: Aj Alva Child - [phone]    Secondary Decision Maker: Jessica Zhong Child - [phone]    Click here to complete Healthcare Decision Makers including selection of the Healthcare Decision Maker Relationship (ie \"Primary\").

## 2021-05-19 NOTE — PROGRESS NOTES
Assisted up to bedside chair with 2 person assist. Family at bedside. Call light within reach. dilaudid given for c/o pain.

## 2021-05-19 NOTE — CARE COORDINATION
Case Management Assessment  Initial Evaluation      Patient Name: Roxanne Rizo  YOB: 1935  Diagnosis: Small bowel obstruction ([address] [E05.404]  Date / Time: 5/17/2021 11:45 AM    Admission status/Date: 5/17/21 inpt  Chart Reviewed: Yes      Patient Interviewed: Yes   Family Interviewed:  Yes - Son Justin Mancilla      Hospitalization in the last 30 days:  No      Health Care Decision Maker :   Primary Decision MakeLindsey Saran - Child - [phone]    Secondary Decision Maker: Maribeth Alberta - Child - [phone]    (CM - must 1st enter selection under Navigator - emergency contact- Health Care Decision Maker Relationship and pick relationship)   Who do you trust or have selected to make healthcare decisions for you      Met with:  Patient at bedside  Interview conducted  (bedside/phone):    Current PCP: [address] required for SNF : N          3 night stay required - Y    ADLS  Support Systems/Care Needs: Spouse/Significant Other, [facility]  Transportation: self    Meal Preparation: Self    Housing  Living Arrangements: lives 2 story home  Steps: 1  Intent for return to present living arrangements: Yes  Identified Issues:     [address] with [address] : No Agency:(Services)  Type of Home Care Services: None  Passport/Waiver : No  :                      Phone Number:    Passport/Waiver Services: N/A          Durable Medical Equiptment   DME Provider:   Equipment:   Walker_x__Cane_x__RTS___ BSC___Shower Chair_x__Hospital Bed___W/C____Other________  02 at ____Liter(s)---wears(frequency)_______ HHN ___ CPAP___ BiPap___   N/A____      Home O2 Use :  No    If No for home O2---if presently on O2 during hospitalization:  No  if yes CM to follow for potential DC O2 need  Informed of need for care provider to bring portable home O2 tank on day of discharge for nursing to connect prior to leaving:   Not Indicated  Verbalized agreement/Understanding:   Not Indicated    Community Service Affiliation  Dialysis:  No    · Agency:  · Location:  · Dialysis Schedule:  · Phone:   · Fax: Other Community Services: (ex:PT/OT,Mental Health,Wound Clinic, Cardio/Pul [address]) None    DISCHARGE PLAN: Explained Case Management role/services. Reviewed chart and met who pt at bedside. Role of CM explained. States lives home with Rand Levy who assist if needed. States typically indep with care and cooking. HAs ability to stay on 1st floor of home if needed. Will follow for poss C needs.

## 2021-05-19 NOTE — PROGRESS NOTES
General Surgery - Jenn Watkins, APRN - CNP, CNP  Daily Progress Note    Pt Name: [address] Record Number: [id number]  Date of Birth 1935   Today's Date: 5/19/2021    ASSESSMENT  1. POD #1 S/P right colectomy and liver biopsy   2. ABD: soft, +incisional tenderness, no N/V, no flatus, no BM, SHARATH in place, dressing intact, + mild distention. 3. Leuks 16.7  4. LFTs improved  5.   6. SHARATH 175: serosanguinous  7. VSS   8. Sawyer: up [address]  1. IVF to 100  2. Pain control   3. OOB/ambulate  4. Continue sawyer for 1 more day for strict I&Os  5. DVT proph: Lovneox  6. GI proph: Pepcid  7. Pt looks good POD #1awaiting return of bowel function. SUBJECTIVE  Symone Candelario has slightly improved from yesterday. Pain is well controlled. She has no nausea and no vomiting. She has not passed flatus and has not had a bowel movement. She is NPO with an NGT in place. Current activity is up with assistance    OBJECTIVE  VITALS:  height is 5' (1.524 m) and weight is 128 lb 3.2 oz (58.2 kg). Her oral temperature is 98.2 °F (36.8 °C). Her blood pressure is 122/58 (abnormal) and her pulse is 77. Her respiration is 16 and oxygen saturation is 94%. VITALS:  BP (!) 122/58   Pulse 77   Temp 98.2 °F (36.8 °C) (Oral)   Resp 16   Ht 5' (1.524 m)   Wt 128 lb 3.2 oz (58.2 kg)   SpO2 94%   BMI 25.04 kg/m²   INTAKE/OUTPUT:    Intake/Output Summary (Last 24 hours) at 5/19/2021 1434  Last data filed at 5/19/2021 1402  Gross per 24 hour   Intake 898 ml   Output 800 ml   Net 98 ml     URINARY CATHETER OUTPUT (Sawyer):     GENERAL: alert, cooperative, no distress    I/O last 3 completed shifts: In: 1898 [P.O.:20; I.V.:1878]  Out: 800 [Urine:475; Emesis/NG output:150; Drains:175]  No intake/output data recorded.     LABS  Recent Labs     05/17/21  1202 05/17/21  1300 05/19/21  0527   WBC 13.4*  --  16.7*   HGB 13.2  --  11.7*   HCT 39.9  --  36.0     --  308   *  --  136   K 3.7  --  3.7   CL 87*  --  101   CO2 29  --  22   BUN 14  --  11   CREATININE 0.8  --  0.7   MG  --   --  1.70*   PHOS  --   --  3.2   CALCIUM 9.9  --  7.9*   INR  --  1.14  --    *  --  109*   *  --  95*   BILITOT 1.3*  --  0.9   BILIDIR  --   --  0.7*   NITRU Negative  --   --    COLORU Yellow  --   --    BACTERIA 3+*  --   --      CBC with Differential:    Lab Results   Component Value Date    WBC 16.7 05/19/2021    RBC 3.92 05/19/2021    HGB 11.7 05/19/2021    HCT 36.0 05/19/2021     05/19/2021    MCV 91.7 05/19/2021    MCH 29.8 05/19/2021    MCHC 32.5 05/19/2021    RDW 12.7 05/19/2021    LYMPHOPCT 7.2 05/19/2021    MONOPCT 8.9 05/19/2021    BASOPCT 0.5 05/19/2021    MONOSABS 1.5 05/19/2021    LYMPHSABS 1.2 05/19/2021    EOSABS 0.0 05/19/2021    BASOSABS 0.1 05/19/2021     CMP:    Lab Results   Component Value Date     05/19/2021    K 3.7 05/19/2021    K 3.7 05/17/2021     05/19/2021    CO2 22 05/19/2021    BUN 11 05/19/2021    CREATININE 0.7 05/19/2021    GFRAA >60 05/19/2021    AGRATIO 0.9 05/17/2021    LABGLOM >60 05/19/2021    GLUCOSE 112 05/19/2021    PROT 5.5 05/19/2021    LABALBU 2.6 05/19/2021    CALCIUM 7.9 05/19/2021    BILITOT 0.9 05/19/2021    ALKPHOS 329 05/19/2021     05/19/2021    ALT 95 05/19/2021         LUCIEN Reis - CNP  Electronically signed 5/19/2021 at 2:08 PM

## 2021-05-20 LAB
A/G RATIO: 0.7 (ref 1.1–2.2)
ALBUMIN SERPL-MCNC: 1.9 G/DL (ref 3.4–5)
ALP BLD-CCNC: 237 U/L (ref 40–129)
ALT SERPL-CCNC: 61 U/L (ref 10–40)
ANION GAP SERPL CALCULATED.3IONS-SCNC: 7 MMOL/L (ref 3–16)
AST SERPL-CCNC: 64 U/L (ref 15–37)
BILIRUB SERPL-MCNC: 0.8 MG/DL (ref 0–1)
BUN BLDV-MCNC: 10 MG/DL (ref 7–20)
CALCIUM SERPL-MCNC: 7.5 MG/DL (ref 8.3–10.6)
CHLORIDE BLD-SCNC: 104 MMOL/L (ref 99–110)
CO2: 24 MMOL/L (ref 21–32)
CREAT SERPL-MCNC: <0.5 MG/DL (ref 0.6–1.2)
GFR AFRICAN AMERICAN: >60
GFR NON-AFRICAN AMERICAN: >60
GLOBULIN: 2.9 G/DL
GLUCOSE BLD-MCNC: 76 MG/DL (ref 70–99)
GLUCOSE BLD-MCNC: 79 MG/DL (ref 70–99)
GLUCOSE BLD-MCNC: 83 MG/DL (ref 70–99)
GLUCOSE BLD-MCNC: 90 MG/DL (ref 70–99)
GLUCOSE BLD-MCNC: 94 MG/DL (ref 70–99)
HCT VFR BLD CALC: 28.9 % (ref 36–48)
HEMOGLOBIN: 9.5 G/DL (ref 12–16)
MCH RBC QN AUTO: 30.4 PG (ref 26–34)
MCHC RBC AUTO-ENTMCNC: 33 G/DL (ref 31–36)
MCV RBC AUTO: 92.1 FL (ref 80–100)
PDW BLD-RTO: 12.9 % (ref 12.4–15.4)
PERFORMED ON: NORMAL
PLATELET # BLD: 242 K/UL (ref 135–450)
PMV BLD AUTO: 8.8 FL (ref 5–10.5)
POTASSIUM SERPL-SCNC: 3.1 MMOL/L (ref 3.5–5.1)
RBC # BLD: 3.14 M/UL (ref 4–5.2)
SODIUM BLD-SCNC: 135 MMOL/L (ref 136–145)
TOTAL PROTEIN: 4.8 G/DL (ref 6.4–8.2)
WBC # BLD: 10.4 K/UL (ref 4–11)

## 2021-05-20 PROCEDURE — 99024 POSTOP FOLLOW-UP VISIT: CPT | Performed by: NURSE PRACTITIONER

## 2021-05-20 PROCEDURE — 1200000000 HC SEMI PRIVATE

## 2021-05-20 PROCEDURE — 6360000002 HC RX W HCPCS: Performed by: SURGERY

## 2021-05-20 PROCEDURE — 2500000003 HC RX 250 WO HCPCS: Performed by: SURGERY

## 2021-05-20 PROCEDURE — 97166 OT EVAL MOD COMPLEX 45 MIN: CPT

## 2021-05-20 PROCEDURE — 85027 COMPLETE CBC AUTOMATED: CPT

## 2021-05-20 PROCEDURE — 97535 SELF CARE MNGMENT TRAINING: CPT

## 2021-05-20 PROCEDURE — 80053 COMPREHEN METABOLIC PANEL: CPT

## 2021-05-20 PROCEDURE — 99232 SBSQ HOSP IP/OBS MODERATE 35: CPT | Performed by: INTERNAL MEDICINE

## 2021-05-20 PROCEDURE — 6360000002 HC RX W HCPCS: Performed by: NURSE PRACTITIONER

## 2021-05-20 PROCEDURE — 2580000003 HC RX 258: Performed by: NURSE PRACTITIONER

## 2021-05-20 PROCEDURE — 97530 THERAPEUTIC ACTIVITIES: CPT

## 2021-05-20 PROCEDURE — 6360000002 HC RX W HCPCS: Performed by: INTERNAL MEDICINE

## 2021-05-20 PROCEDURE — 36415 COLL VENOUS BLD VENIPUNCTURE: CPT

## 2021-05-20 RX ORDER — MORPHINE SULFATE 2 MG/ML
1 INJECTION, SOLUTION INTRAMUSCULAR; INTRAVENOUS
Status: DISCONTINUED | OUTPATIENT
Start: 2021-05-20 | End: 2021-05-23 | Stop reason: HOSPADM

## 2021-05-20 RX ORDER — MORPHINE SULFATE 2 MG/ML
2 INJECTION, SOLUTION INTRAMUSCULAR; INTRAVENOUS
Status: DISCONTINUED | OUTPATIENT
Start: 2021-05-20 | End: 2021-05-21

## 2021-05-20 RX ORDER — POTASSIUM CHLORIDE 7.45 MG/ML
10 INJECTION INTRAVENOUS
Status: COMPLETED | OUTPATIENT
Start: 2021-05-20 | End: 2021-05-20

## 2021-05-20 RX ADMIN — SODIUM CHLORIDE: 9 INJECTION, SOLUTION INTRAVENOUS at 16:08

## 2021-05-20 RX ADMIN — Medication 10 MEQ: at 15:00

## 2021-05-20 RX ADMIN — Medication 10 MEQ: at 13:57

## 2021-05-20 RX ADMIN — MORPHINE SULFATE 1 MG: 2 INJECTION, SOLUTION INTRAMUSCULAR; INTRAVENOUS at 20:34

## 2021-05-20 RX ADMIN — Medication 10 MEQ: at 12:28

## 2021-05-20 RX ADMIN — ENOXAPARIN SODIUM 40 MG: 40 INJECTION SUBCUTANEOUS at 20:00

## 2021-05-20 RX ADMIN — FAMOTIDINE 20 MG: 10 INJECTION, SOLUTION INTRAVENOUS at 20:00

## 2021-05-20 RX ADMIN — Medication 10 MEQ: at 16:07

## 2021-05-20 RX ADMIN — MORPHINE SULFATE 2 MG: 2 INJECTION, SOLUTION INTRAMUSCULAR; INTRAVENOUS at 12:29

## 2021-05-20 ASSESSMENT — PAIN DESCRIPTION - LOCATION: LOCATION: ABDOMEN

## 2021-05-20 ASSESSMENT — PAIN SCALES - GENERAL
PAINLEVEL_OUTOF10: 0
PAINLEVEL_OUTOF10: 4
PAINLEVEL_OUTOF10: 5

## 2021-05-20 NOTE — PROGRESS NOTES
General Surgery - Jenn Brannon, APRN - CNP, CNP  Daily Progress Note    Pt Name: [address] Record Number: [id number]  Date of Birth 1935   Today's Date: 5/20/2021    ASSESSMENT  1. POD #2 S/P right colectomy and liver biopsy   2. ABD: soft, +incisional tenderness, no N/V, no flatus, no BM, SHARATH in place, dressing removed: staples intact, no drainage and left open to air, + mild distention, pain ball pulled out by pt  3. Leuks 16.7->10.4  4. LFTs improved  5. NGT pulled out overnight  6. SHARATH 110: serosanguinous  7. VSS   8. Sawyer:   9. Pt with multiple complaints. She stated \"yes, I pulled out those tubes, no one would help me and I was leaving. \"    PLAN  1. IVF  2. K+ replaced by medicine  3. Pain control: change to morphine (son stated Dilaudid made her \"crazy last night)  4. NPO: may have ice chips and a popsicle  5. PT/OT: OOB/ambulate  6. D/C sawyer  7. DVT proph: Lovneox  8. GI proph: Pepcid  9. Pt looks good POD #2: awaiting return of bowel function. SUBJECTIVE  Corazon Jarrell has slightly improved from yesterday. Pain is somewhat well controlled. She has no nausea and no vomiting. She has not passed flatus and has not had a bowel movement. She is NPO but tolerating ice chips. Current activity is up with assistance    OBJECTIVE  VITALS:  height is 5' (1.524 m) and weight is 128 lb 3.2 oz (58.2 kg). Her oral temperature is 98.6 °F (37 °C). Her blood pressure is 132/61 and her pulse is 72. Her respiration is 16 and oxygen saturation is 96%.    VITALS:  /61   Pulse 72   Temp 98.6 °F (37 °C) (Oral)   Resp 16   Ht 5' (1.524 m)   Wt 128 lb 3.2 oz (58.2 kg)   SpO2 96%   BMI 25.04 kg/m²   INTAKE/OUTPUT:      Intake/Output Summary (Last 24 hours) at 5/20/2021 1308  Last data filed at 5/20/2021 0428  Gross per 24 hour   Intake 1180 ml   Output 1060 ml   Net 120 ml     URINARY CATHETER OUTPUT (Sawyer):     GENERAL: alert, cooperative, no distress    I/O last 3 completed

## 2021-05-20 NOTE — PLAN OF CARE
Problem: Infection:  Goal: Will remain free from infection  Description: Will remain free from infection  5/20/2021 1105 by Marcos Alejo RN  Outcome: Ongoing  5/20/2021 0031 by Brit Cason RN  Outcome: Ongoing     Problem: Safety:  Goal: Free from accidental physical injury  Description: Free from accidental physical injury  5/20/2021 0031 by Brit Cason RN  Outcome: Ongoing  Goal: Free from intentional harm  Description: Free from intentional harm  5/20/2021 0031 by Brit Cason RN  Outcome: Ongoing     Problem: Daily Care:  Goal: Daily care needs are met  Description: Daily care needs are met  5/20/2021 0031 by Brit Cason RN  Outcome: Ongoing     Problem: Pain:  Goal: Patient's pain/discomfort is manageable  Description: Patient's pain/discomfort is manageable  5/20/2021 0031 by Brit Cason RN  Outcome: Ongoing     Problem: Skin Integrity:  Goal: Skin integrity will stabilize  Description: Skin integrity will stabilize  5/20/2021 0031 by Brit Cason RN  Outcome: Ongoing     Problem: Discharge Planning:  Goal: Patients continuum of care needs are met  Description: Patients continuum of care needs are met  5/20/2021 1105 by Marcos Alejo RN  Outcome: Ongoing  5/20/2021 0031 by Brit Cason RN  Outcome: Ongoing     Problem: Falls - Risk of:  Goal: Will remain free from falls  Description: Will remain free from falls  5/20/2021 1105 by Marcos Alejo RN  Outcome: Ongoing  5/20/2021 0031 by Brit Cason RN  Outcome: Ongoing  Goal: Absence of physical injury  Description: Absence of physical injury  5/20/2021 0031 by Brit Cason RN  Outcome: Ongoing     Problem: Nutrition  Goal: Optimal nutrition therapy  5/20/2021 0031 by Brit Cason RN  Outcome: Ongoing

## 2021-05-20 NOTE — PROGRESS NOTES
Patient is laying in bed with lights off. She reports hearing voices of ladies laughing and talking and also very loud noises. Oriented to place and explained she may be hearing other nurses or patients. Assured her she was safe. Pt oriented X3 disoriented to time at this time. Spoke with granddaughter on phone at this time who reports patient is not used to taking pain medications and may be why she is more confused. Pt reports pain but when explained it would be rectal she refused. Dressing C, D and I with old drainage noted. NG in tact with bile output noted. Pain ball also noted dressing C, D and I. No s/s of distress at this time. Call light and bedside table within reach. Will continue to monitor.

## 2021-05-20 NOTE — PROGRESS NOTES
Pt found with NG and pain ball pulled out as well as dressing to midline removed. Pt is agitated stating she is unhappy with the care provided because she was just \"left here\" when ask patient what she needs she does not give any answer just complains she is not getting good care. Redressed midline incision which is dry and intact. Attempt to put NG back in and pt refuses and resisting. She wants to call her son so he was reached and she ask him to come in. clinical approved and pt son is coming to be at bedside. Charge and clinical updated.

## 2021-05-20 NOTE — PROGRESS NOTES
Admit: 2021    Name:  Haresh Biggs  Room:  [address] [place] [address]  MRN:    [id number]    Daily Progress Note for 2021     Interval History:     Not passing gas yet     Scheduled Meds:   enoxaparin  40 mg Subcutaneous Q24H    famotidine (PEPCID) injection  20 mg Intravenous Daily       Continuous Infusions:   bupivacaine 0.5% 270 mL (21 1429)    sodium chloride 100 mL/hr at 21 1810       PRN Meds:  acetaminophen, phenol, promethazine, HYDROmorphone, ondansetron                  Objective:     Temp  Av.5 °F (36.9 °C)  Min: 98.2 °F (36.8 °C)  Max: 98.8 °F (37.1 °C)  Pulse  Av.6  Min: 72  Max: 84  BP  Min: 117/64  Max: 134/64  SpO2  Av.2 %  Min: 94 %  Max: 96 %  Patient Vitals for the past 4 hrs:   BP Temp Temp src Pulse Resp SpO2   21 0736 132/61 98.6 °F (37 °C) Oral 72 16 96 %         Intake/Output Summary (Last 24 hours) at 2021 1046  Last data filed at 2021 0428  Gross per 24 hour   Intake 1180 ml   Output 1110 ml   Net 70 ml       Physical Exam:  Gen: No distress. Alert. Eyes: PERRL. No sclera icterus. No conjunctival injection. ENT: No discharge. Pharynx clear. NGT  Neck: Trachea midline. Resp: No accessory muscle use. No crackles. No wheezes. No rhonchi. CV: Regular rate. Regular rhythm. No murmur. No rub. No edema. GI: appropriately tender,. No hernia. Surgical dressing. Skin: Warm and dry. No nodule on exposed extremities. No rash on exposed extremities. M/S: No cyanosis. No joint deformity. No clubbing. Neuro: Awake. Grossly nonfocal    Psych: Oriented x 3. No anxiety or agitation.    Lab Data:  CBC:   Recent Labs     21  0532 21  0527 21  0553   WBC 9.4 16.7* 10.4   RBC 3.80* 3.92* 3.14*   HGB 11.4* 11.7* 9.5*   HCT 34.6* 36.0 28.9*   MCV 90.9 91.7 92.1   RDW 12.7 12.7 12.9    308 242     BMP:   Recent Labs     21  0532 21  0527 21  0553   * 136 135*   K 3.6 3.7 3.1*   CL 98* 101 104   CO2 25 22 24   PHOS 2.6 3.2  --    BUN 11 11 10   CREATININE 0.6 0.7 <0.5*     BNP: No results for input(s): BNP in the last 72 hours. PT/INR:   Recent Labs     05/17/21  1300   PROTIME 13.2   INR 1.14     APTT:  Recent Labs     05/17/21  1300   APTT 27.0     CARDIAC ENZYMES:   Recent Labs     05/17/21  1202   TROPONINI <0.01     FASTING LIPID PANEL:No results found for: CHOL, HDL, TRIG  LIVER PROFILE:   Recent Labs     05/17/21  1202 05/19/21  0527 05/20/21  0553   * 109* 64*   * 95* 61*   BILIDIR  --  0.7*  --    BILITOT 1.3* 0.9 0.8   ALKPHOS 584* 329* 237*           XR CHEST PORTABLE   Final Result   No radiographic evidence of acute pulmonary disease. CT a/p    Partial small bowel obstruction versus ileus. Lincoln Maizes is moderate dilation of   the small bowel with transition at the terminal ileum.  The degree of small   bowel dilation is mildly increased since 05/09/2021. Neoplasm versus inflammation of the cecum/terminal ileum.  Mural thickening,   especially the ascending colon, is persistent. Stable multiple hepatic nodules and portacaval/gastrohepatic adenopathy. Small amount of ascites, perihepatic and perienteric, perhaps slightly   increased. Stable triangular-shaped 1.5 cm nodule in the left lung base,   inflammation/atelectasis versus metastasis. Assessment & Plan:     Patient Active Problem List    Diagnosis Date Noted    Liver nodule     Ileus ([address]     Acute cystitis with hematuria     Elevated liver enzymes     Colonic mass     Small bowel obstruction ([address] 05/17/2021     Partial SBO  Cecal mass  - admitted to med-surg. Management per general surgery  - NPO, IVF's  - S/p right colectomy, liver biopsy pending   - pain control: Dilaudid prn  - NG Tube to wall suction  NG removed      Hyponatremia  - due to fluid volume depletion  Resolved with IVF      Elevated LFTs  multiple hepatic nodules and portacaval/gastrohepatic adenopathy on CT   - will repeat LFT's. Liver biopsy done- results pending      Leukocytosis  Resolved      Hepatic nodules  Pulmonary nodule   - workup as above. Pathology pending  - may need pulm consult.    - liver biopsy done     DVT Prophylaxis: Lovenox  Diet: Diet NPO Effective Now Exceptions are: Ice Chips, Sips with Meds  Code Status: Full Code      Shaan Kee MD

## 2021-05-20 NOTE — PROGRESS NOTES
Patient is laying with eyes. No s/s of distress at this time. Call light and bedside table within reach. Will continue to monitor.

## 2021-05-20 NOTE — PROGRESS NOTES
Pt called out and ask for nurse. When enter the room patient ask to turn the light on. She was upset her light was off. And complains again of the female voices. States they are laughing and carrying on and feels they are talking about her. Assure patient she is safe and everyone is busy caring for patients. She ask for ice chips and is upset I only gave her half a cup. Denies other needs. Call light within reach. BA on.

## 2021-05-20 NOTE — PROGRESS NOTES
Yen removed. 10cc of water removed from the balloon and pt. Tolerated well.  Will monitor for urine output after removal.

## 2021-05-20 NOTE — PROGRESS NOTES
Handoff report and transfer of care given at bedside to [facility]. Patient in stable condition, denies needs/concerns at this time. Call light within reach.

## 2021-05-20 NOTE — FLOWSHEET NOTE
05/20/21 0736   Vital Signs   Temp 98.6 °F (37 °C)   Temp Source Oral   Pulse 72   Heart Rate Source Monitor   Resp 16   /61   BP Location Right upper arm   Patient Position Supine   Level of Consciousness Alert (0)   MEWS Score 1   Patient Currently in Pain Denies   Pain Assessment   Pain Assessment 0-10   Pain Level 0   Oxygen Therapy   SpO2 96 %   O2 Device None (Room air)   AM assessment completed, see flow sheet. Pt is alert and oriented. Vital signs are WNL. Respirations are even & easy. No complaints voiced. Pt. Pulled her NG tube and pain ball out last night. The dressigns were changed then and the pt. Left her SHARATH drain in place. Pt denies needs at this time. SR up x 2, and bed in low position. Call light is within reach. Bedside Mobility Assessment Tool (BMAT):     Assessment Level 1- Sit and Shake    1. From a semi-reclined position, ask patient to sit up and rotate to a seated position at the side of the bed. Can use the bedrail. 2. Ask patient to reach out and grab your hand and shake making sure patient reaches across his/her midline. Pass- Patient is able to come to a seated position, maintain core strength. Maintains seated balance while reaching across midline. Move on to Assessment Level 2. Assessment Level 2- Stretch and Point   1. With patient in seated position at the side of the bed, have patient place both feet on the floor (or stool) with knees no higher than hips. 2. Ask patient to stretch one leg and straighten the knee, then bend the ankle/flex and point the toes. If appropriate, repeat with the other leg. Pass- Patient is able to demonstrate appropriate quad strength on intended weight bearing limb(s). Move onto Assessment Level 3. Assessment Level 3- Stand   1. Ask patient to elevate off the bed or chair (seated to standing) using an assistive device (cane, bedrail). 2. Patient should be able to raise buttocks off be and hold for a count of five.  May repeat once. Fail- Patient unable to demonstrate standing stability. Patient is MOBILITY LEVEL 3. Assessment Level 4- Walk   1. Ask patient to march in place at bedside. 2. Then ask patient to advance step and return each foot. Some medical conditions may render a patient from stepping backwards, use your best clinical judgement. Fail- Patient not able to complete tasks OR requires use of assistive device. Patient is MOBILITY LEVEL 3. Mobility Level- 3    Patient is able to demonstrate the ability to move from a reclining position to an upright position within the recliner.

## 2021-05-20 NOTE — PROGRESS NOTES
Inpatient Occupational Therapy  Evaluation and Treatment    Unit: [address] [place]  Date:  5/20/2021  Patient Name:    Radha Mensah  Admitting diagnosis:  Small bowel obstruction [facility]) [O28.623]  Admit Date:  5/17/2021  Precautions/Restrictions/WB Status/ Lines/ Wounds/ Oxygen: Fall risk, Bed/chair alarm and Lines -IV , drain, abdominal incision     RIGHT COLECTOMY, LIVER BIOPSY -5-18-21     Treatment Time:  1500- 1555  Treatment Number: 1   Timed code treatment minutes 45 minutes   Total Treatment minutes:   55   minutes    Patient Goals for Therapy:  \" see my great grand dtr  \"      Discharge Recommendations: Home 24 hr assist  and Home OT  DME needs for discharge: Needs Met       Therapy recommendations for staff:   Assist of 1 with use of hand held assist  for all transfers to chair     History of Present Illness: per H&P on 5-17-21 Linda Thompson MD   81 y.o. female brought in today by private vehicle with complaints of nausea and vomiting. She reports that her vomit has been [place] in [employer]". She also reports generalized abdominal pain. Patient was recently seen here and found to have a colonic mass with liver metastasize and was offered admission however declined admission at that time. Onset of symptoms have been over the past 1 week. Duration symptoms have been persistent since onset. Context includes nausea and vomiting as well as generalized abdominal pain. She denies fevers or chills. Denies chest pain or shortness of breath. Denies urinary complaints. No aggravating symptoms. No alleviating symptoms. States she was supposed to have a colonoscopy today and was unable to drink the colonoscopy prep and called her doctor who recommended she come into the ED for further evaluation. She has not tried anything else at home.   Nothing seems to make symptoms better or worse.     Patient does have a history of metastatic colon cancer   Home Health S4 Level Recommendation:  Level 1 Standard  AM-PAC Score: Preadmission Environment    Pt. Lives with family (son)  Home environment:  two story home  Steps to enter first floor: one steps to enter  Steps to second floor: Full flight of 12-13  Bathroom: tub/shower unit, standard height commode and shower seat   Equipment owned: [address] [place] [address] and shower chair    Preadmission Status:  Pt. Able to drive: Yes  Pt Fully independent with ADLs: Yes  Pt. Required assistance from family for: Cleaning, Cooking and Laundry  pt assists with all activities   Pt. independent for transfers and gait and walked with No Device  History of falls No    Pain  Yes  Rating:mild  Location:abdomen   Pain Medicine Status: No request made      Cognition    A&O oriented to  and hospital, pt aware it was May and that it was . Pt could not name specfic date  Able to follow 2 step commands    Subjective  Patient lying supine in bed with visitor present. Pt agreeable to this OT eval & tx. Upper Extremity ROM:    WFL    Upper Extremity Strength:    WFL     Upper Extremity Sensation    WFL    Upper Extremity Proprioception:  WFL    Coordination and Tone  WFL    Balance  Functional Sitting Balance:  WFL  Functional Standing Balance:Diminished    Bed mobility:    Supine to sit:   SBA head of bed elevated   Sit to supine:   Not Tested  Rolling:    Not Tested  Scooting in sitting:  SBA  Scooting to head of bed:   Not Tested    Bridging:   Not Tested    Transfers:    Sit to stand:  CGA  Stand to sit:  CGA  Bed to chair:   CGA with hand held assist   Standard toilet: Not Tested  Bed to [facility]:  Not Tested    Dressing:      UE:   Not Tested  LE:    Mod A  to don socks     Bathing:    UE:  Not Tested  LE:  Not Tested    Eating:   Independent- ice chips     Toileting:  Not Tested    Activity Tolerance   Pt completed therapy session with min pain with supine to sit     Positioning Needs:   Pt up in chair, alarm set, positioned in proper neutral alignment and pressure relief provided.      Exercise / Activities Initiated:   N/A    Patient/Family Education:   Role of OT    Assessment of Deficits: Pt seen for Occupational therapy evaluation in acute care setting. Pt demonstrated decreased Activity tolerance, ADLs, Balance , Bed mobility and Transfers. Pt functioning below baseline and will likely benefit from skilled occupational therapy services to maximize safety and independence. Goal(s) : To be met in 3 Visits:  1). Bed to toilet/BSC: SBA with AD if needed     To be met in 5 Visits:  1). Supine to/from Sit:  Independent  2). Upper Body Bathing:   Independent  3). Lower Body Bathing:   SBA  4). Upper Body Dressing:  Independent  5). Lower Body Dressing:  CGA  6). Pt to demonstrate UE exs x 15 reps with minimal cues    Rehabilitation Potential:  Good for goals listed above. Strengths for achieving goals include: Pt cooperative  Barriers to achieving goals include:  Pain     Plan: To be seen 3-5 x/wk while in acute care setting for therapeutic exercises, bed mobility, transfers, dressing, bathing, family/patient education, ADL/IADL retraining, energy conservation training.      Eloisa Goyal OTR/L [id number]          If patient discharges from this facility prior to next visit, this note will serve as the Discharge Summary

## 2021-05-21 LAB
A/G RATIO: 0.7 (ref 1.1–2.2)
ALBUMIN SERPL-MCNC: 2.2 G/DL (ref 3.4–5)
ALP BLD-CCNC: 286 U/L (ref 40–129)
ALT SERPL-CCNC: 53 U/L (ref 10–40)
ANION GAP SERPL CALCULATED.3IONS-SCNC: 7 MMOL/L (ref 3–16)
AST SERPL-CCNC: 59 U/L (ref 15–37)
BILIRUB SERPL-MCNC: 1.1 MG/DL (ref 0–1)
BUN BLDV-MCNC: 6 MG/DL (ref 7–20)
CALCIUM SERPL-MCNC: 7.9 MG/DL (ref 8.3–10.6)
CHLORIDE BLD-SCNC: 105 MMOL/L (ref 99–110)
CO2: 26 MMOL/L (ref 21–32)
CREAT SERPL-MCNC: <0.5 MG/DL (ref 0.6–1.2)
GFR AFRICAN AMERICAN: >60
GFR NON-AFRICAN AMERICAN: >60
GLOBULIN: 3.1 G/DL
GLUCOSE BLD-MCNC: 102 MG/DL (ref 70–99)
GLUCOSE BLD-MCNC: 88 MG/DL (ref 70–99)
GLUCOSE BLD-MCNC: 89 MG/DL (ref 70–99)
GLUCOSE BLD-MCNC: 91 MG/DL (ref 70–99)
GLUCOSE BLD-MCNC: 92 MG/DL (ref 70–99)
HCT VFR BLD CALC: 30.5 % (ref 36–48)
HEMOGLOBIN: 10.1 G/DL (ref 12–16)
PERFORMED ON: NORMAL
POTASSIUM SERPL-SCNC: 3.4 MMOL/L (ref 3.5–5.1)
SODIUM BLD-SCNC: 138 MMOL/L (ref 136–145)
TOTAL PROTEIN: 5.3 G/DL (ref 6.4–8.2)

## 2021-05-21 PROCEDURE — 6370000000 HC RX 637 (ALT 250 FOR IP): Performed by: NURSE PRACTITIONER

## 2021-05-21 PROCEDURE — 2580000003 HC RX 258: Performed by: NURSE PRACTITIONER

## 2021-05-21 PROCEDURE — 99024 POSTOP FOLLOW-UP VISIT: CPT | Performed by: NURSE PRACTITIONER

## 2021-05-21 PROCEDURE — 2500000003 HC RX 250 WO HCPCS: Performed by: SURGERY

## 2021-05-21 PROCEDURE — 80053 COMPREHEN METABOLIC PANEL: CPT

## 2021-05-21 PROCEDURE — 36415 COLL VENOUS BLD VENIPUNCTURE: CPT

## 2021-05-21 PROCEDURE — 85014 HEMATOCRIT: CPT

## 2021-05-21 PROCEDURE — 1200000000 HC SEMI PRIVATE

## 2021-05-21 PROCEDURE — 85018 HEMOGLOBIN: CPT

## 2021-05-21 PROCEDURE — 97161 PT EVAL LOW COMPLEX 20 MIN: CPT

## 2021-05-21 PROCEDURE — 6360000002 HC RX W HCPCS: Performed by: SURGERY

## 2021-05-21 PROCEDURE — 99232 SBSQ HOSP IP/OBS MODERATE 35: CPT | Performed by: INTERNAL MEDICINE

## 2021-05-21 PROCEDURE — 97530 THERAPEUTIC ACTIVITIES: CPT

## 2021-05-21 RX ORDER — OXYCODONE HYDROCHLORIDE 5 MG/1
10 TABLET ORAL EVERY 4 HOURS PRN
Status: DISCONTINUED | OUTPATIENT
Start: 2021-05-21 | End: 2021-05-23 | Stop reason: HOSPADM

## 2021-05-21 RX ORDER — OXYCODONE HYDROCHLORIDE 5 MG/1
5 TABLET ORAL EVERY 4 HOURS PRN
Status: DISCONTINUED | OUTPATIENT
Start: 2021-05-21 | End: 2021-05-23 | Stop reason: HOSPADM

## 2021-05-21 RX ADMIN — OXYCODONE HYDROCHLORIDE 10 MG: 5 TABLET ORAL at 13:54

## 2021-05-21 RX ADMIN — SODIUM CHLORIDE: 9 INJECTION, SOLUTION INTRAVENOUS at 02:24

## 2021-05-21 RX ADMIN — OXYCODONE HYDROCHLORIDE 10 MG: 5 TABLET ORAL at 21:11

## 2021-05-21 RX ADMIN — ENOXAPARIN SODIUM 40 MG: 40 INJECTION SUBCUTANEOUS at 21:11

## 2021-05-21 RX ADMIN — FAMOTIDINE 20 MG: 10 INJECTION, SOLUTION INTRAVENOUS at 21:11

## 2021-05-21 RX ADMIN — SODIUM CHLORIDE: 9 INJECTION, SOLUTION INTRAVENOUS at 13:55

## 2021-05-21 ASSESSMENT — PAIN DESCRIPTION - PROGRESSION: CLINICAL_PROGRESSION: NOT CHANGED

## 2021-05-21 ASSESSMENT — PAIN DESCRIPTION - PAIN TYPE: TYPE: SURGICAL PAIN

## 2021-05-21 ASSESSMENT — PAIN DESCRIPTION - FREQUENCY: FREQUENCY: CONTINUOUS

## 2021-05-21 ASSESSMENT — PAIN DESCRIPTION - LOCATION: LOCATION: ABDOMEN

## 2021-05-21 ASSESSMENT — PAIN SCALES - GENERAL: PAINLEVEL_OUTOF10: 5

## 2021-05-21 ASSESSMENT — PAIN - FUNCTIONAL ASSESSMENT: PAIN_FUNCTIONAL_ASSESSMENT: PREVENTS OR INTERFERES SOME ACTIVE ACTIVITIES AND ADLS

## 2021-05-21 ASSESSMENT — PAIN DESCRIPTION - ORIENTATION: ORIENTATION: MID

## 2021-05-21 NOTE — PLAN OF CARE
Problem: Infection:  Goal: Will remain free from infection  Description: Will remain free from infection  5/21/2021 1111 by Jazmyne Álvarez RN  Outcome: Ongoing  5/20/2021 2202 by Kirby Elder RN  Outcome: Ongoing     Problem: Safety:  Goal: Free from accidental physical injury  Description: Free from accidental physical injury  5/21/2021 1111 by Jazmyne Álvarez RN  Outcome: Ongoing  5/20/2021 2202 by Kirby Elder RN  Outcome: Ongoing  Goal: Free from intentional harm  Description: Free from intentional harm  5/20/2021 2202 by Kirby Elder RN  Outcome: Ongoing     Problem: Daily Care:  Goal: Daily care needs are met  Description: Daily care needs are met  5/20/2021 2202 by Kirby Elder RN  Outcome: Ongoing     Problem: Pain:  Goal: Patient's pain/discomfort is manageable  Description: Patient's pain/discomfort is manageable  5/21/2021 1111 by Jazmyne Álvarez RN  Outcome: Ongoing  5/20/2021 2202 by Kirby Elder RN  Outcome: Ongoing  Goal: Pain level will decrease  Description: Pain level will decrease  Outcome: Ongoing     Problem: Skin Integrity:  Goal: Skin integrity will stabilize  Description: Skin integrity will stabilize  5/20/2021 2202 by Kirby Elder RN  Outcome: Ongoing     Problem: Discharge Planning:  Goal: Patients continuum of care needs are met  Description: Patients continuum of care needs are met  5/20/2021 2202 by Kirby Elder RN  Outcome: Ongoing     Problem: Falls - Risk of:  Goal: Will remain free from falls  Description: Will remain free from falls  5/20/2021 2202 by Kirby Elder RN  Outcome: Ongoing  Goal: Absence of physical injury  Description: Absence of physical injury  5/20/2021 2202 by Kirby Elder RN  Outcome: Ongoing     Problem: Nutrition  Goal: Optimal nutrition therapy  5/20/2021 2202 by Kirby Elder RN  Outcome: Ongoing

## 2021-05-21 NOTE — PROGRESS NOTES
General Surgery - Jenn Bates, APRN - CNP, CNP  Daily Progress Note    Pt Name: [address] Record Number: [id number]  Date of Birth 1935   Today's Date: 5/21/2021    ASSESSMENT  1. POD #3 S/P right colectomy and liver biopsy   2. ABD: soft, +incisional tenderness, no N/V, + flatus, + BM, SHARATH in place, dressing removed: staples intact, no drainage and left open to air, + mild distention, pain ball pulled out by pt  3. Leuks 16.7->10.4  4. H&H stable  5. LFTs improved  6. SHARATH 310: serosanguinous  7. VSS   8. UO good with yen out  9. Pt reports she \"feels better\"    PLAN  1. IVF to 590 ml  2. Pain control: add PO  3. Full liquids  4. PT/OT: OOB/ambulate  5. DVT proph: Lovneox  6. GI proph: Pepcid  7. Pt looks good POD #3: awaiting return of bowel function. SUBJECTIVE  Arecari Anne has slightly improved from yesterday. Pain is well controlled. She has no nausea and no vomiting. She has passed flatus and has had a bowel movement. She is tolerating ice chips. Current activity is up with assistance    OBJECTIVE  VITALS:  height is 5' (1.524 m) and weight is 128 lb 3.2 oz (58.2 kg). Her oral temperature is 97.6 °F (36.4 °C). Her blood pressure is 161/67 (abnormal) and her pulse is 75. Her respiration is 16 and oxygen saturation is 97%. VITALS:  BP (!) 161/67   Pulse 75   Temp 97.6 °F (36.4 °C) (Oral)   Resp 16   Ht 5' (1.524 m)   Wt 128 lb 3.2 oz (58.2 kg)   SpO2 97%   BMI 25.04 kg/m²   INTAKE/OUTPUT:      Intake/Output Summary (Last 24 hours) at 5/21/2021 1259  Last data filed at 5/21/2021 6124  Gross per 24 hour   Intake 980 ml   Output 385 ml   Net 595 ml     URINARY CATHETER OUTPUT (Yen):     GENERAL: alert, cooperative, no distress    I/O last 3 completed shifts:   In: 980 [I.V.:980]  Out: 310 [Drains:310]  I/O this shift:  In: -   Out: 125 [Drains:125]    LABS  Recent Labs     05/19/21  0527 05/20/21  0553 05/21/21  0555 05/21/21  0559   WBC 16.7* 10.4  --   --    HGB

## 2021-05-21 NOTE — PROGRESS NOTES
Pt did take one dose of morphine after that refused it. It was offered several times. Pt stated I've heard about morphine & I don't like it. Pt was encouraged to use the morphine when she is in pain & the also told her that the dilaudid was stronger. Pt was assisted to bathroom several times to void. Tolerating ice chips. She refused pop nilo offered. SHARATH draining over 100 cc this shift.

## 2021-05-21 NOTE — PROGRESS NOTES
Comprehensive Nutrition Assessment    Type and Reason for Visit:  Reassess    Nutrition Recommendations/Plan:   1. Continue FL   2. Added Ensure HP TID     Nutrition Assessment:    Pt. remains nutritionally compromised AEB she was admitted with a colon mass and POD #3 S/P right colectomy and liver biopsy. At risk for further nutrition compromise r/t she is consuming < 50% meals . Will continue FL diet and added ensure HP to meals . Malnutrition Assessment:  Malnutrition Status: At risk for malnutrition (Comment)    Context:  Acute Illness     Findings of the 6 clinical characteristics of malnutrition:  Energy Intake:  No significant decrease in energy intake  Weight Loss:  Unable to assess     Body Fat Loss:  No significant body fat loss Orbital   Muscle Mass Loss:  1 - Mild muscle mass loss Temples (temporalis), Clavicles (pectoralis & deltoids)  Fluid Accumulation:  Unable to assess     Strength:  Not Performed    Estimated Daily Nutrient Needs:  Energy (kcal):  0439-5896 based ~ 20-25kcal/kg cbw; Weight Used for Energy Requirements:  Current     Protein (g):  55-64 based ~ 1.2-1.4 gr/kg ibw; Weight Used for Protein Requirements:  Ideal        Fluid (ml/day):  5719-7427; Method Used for Fluid Requirements:  1 ml/kcal      Nutrition Related Findings:  POD #3 S/P right colectomy and liver biopsy,no N/V, + flatus, + BM, H&H stable, SHARATH 310 mls,UO good with sawyer out, \"feels better\"      Wounds:  Surgical Incision       Current Nutrition Therapies:    DIET FULL LIQUID; Anthropometric Measures:  · Height: 5' (152.4 cm)  · Current Body Weight: 137 lb (62.1 kg)   · Admission Body Weight: 128 lb (58.1 kg)    · Usual Body Weight:  (unknown)     · Ideal Body Weight: 100 lbs; % Ideal Body Weight 128 %   · BMI: 26.8  · BMI Categories: Overweight (BMI 25.0-29. 9)       Nutrition Diagnosis:   · Inadequate oral intake related to altered GI function, altered GI structure as evidenced by NPO or clear liquid status due to medical condition, wounds, mild muscle loss, mild loss of subcutaneous fat, GI abnormality      Nutrition Interventions:   Food and/or Nutrient Delivery:  Continue Current Diet, Start Oral Nutrition Supplement  Nutrition Education/Counseling:  No recommendation at this time   Coordination of Nutrition Care:  Continue to monitor while inpatient    Goals:  pt will continue to tolerate a PO diet w/o N/V and will conume > 50% of meals and supps       Nutrition Monitoring and Evaluation:   Behavioral-Environmental Outcomes:  None Identified   Food/Nutrient Intake Outcomes:  Food and Nutrient Intake, Supplement Intake, Diet Advancement/Tolerance  Physical Signs/Symptoms Outcomes:  Biochemical Data, GI Status, Nausea or Vomiting, Weight, Nutrition Focused Physical Findings     Discharge Planning:     Too soon to determine     Electronically signed by Anahi Ceja RD, LD on 5/21/21 at 4:22 PM EDT    Contact: [phone]

## 2021-05-21 NOTE — PROGRESS NOTES
Inpatient Physical Therapy Evaluation and Treatment    Unit: [address]  Date:  5/21/2021  Patient Name:    Annie Ramirez  Admitting diagnosis:  Small bowel obstruction [facility]) [X46.381]  Admit Date:  5/17/2021  Precautions/Restrictions/WB Status/ Lines/ Wounds/ Oxygen: Fall risk, Bed/chair alarm, Lines -IV and drain and abdominal incision    Treatment Time:  1177-1391  Treatment Number:  1   Timed Code Treatment Minutes: 32 minutes  Total Treatment Minutes:  42 minutes    Patient Goals for Therapy: \" to go home \"          Discharge Recommendations: Home 24 hr assist  and Home PT  DME needs for discharge: Needs Met       Therapy recommendation for EMS Transport: can transport by wheelchair    Therapy recommendations for staff:   Stand by assist with use of gait belt and hand held assist for all transfers and ambulation to/from chair  to/from bathroom    History of Present Illness: per H&P on 5-17-21 Kaylee Stephens MD   \"7 y. o. female brought in today by private vehicle with complaints of nausea and vomiting.  She reports that her vomit has been \"dark in [employer] also reports generalized abdominal pain.  Patient was recently seen here and found to have a colonic mass with liver metastasize and was offered admission however declined admission at that time.  Onset of symptoms have been over the past 1 week.  Duration symptoms have been persistent since onset.  Context includes nausea and vomiting as well as generalized abdominal pain.  She denies fevers or chills.  Denies chest pain or shortness of breath.  Denies urinary complaints.  No aggravating symptoms.  No alleviating symptoms.  States she was supposed to have a colonoscopy today and was unable to drink the colonoscopy prep and called her doctor who recommended she come into the ED for further evaluation.  She has not tried anything else at home.  Nothing seems to make symptoms better or worse.     Patient does have a history of metastatic colon cancer\"    Home Health S4 Level Recommendation:  Level 1 Standard  AM-PAC Mobility Score       AM-PAC Inpatient Mobility without Stair Climbing Raw Score : 15    Preadmission Environment    Pt. Lives with family (son)  Home environment:    two story home (pt able to live on main level; full bath on main level)  Steps to enter first floor: one steps to enter  Steps to second floor: Full flight of 12-13  Bathroom: tub/shower unit, standard height commode and shower seat   Equipment owned: , [facility] and shower chair     Preadmission Status:  Pt. Able to drive: Yes  Pt Fully independent with ADLs: Yes  Pt. Required assistance from family for: Cleaning, Cooking and Laundry  pt assists with all activities   Pt. independent for transfers and gait and walked with No Device  History of falls No    Pain   Yes  Location: abdomen  Rating: moderate /10  Pain Medicine Status: No request made    Cognition    A&O x4   Able to follow 2 step commands    Subjective  Patient lying supine in bed with PCA in room. .   Pt agreeable to this PT eval & tx. Upper Extremity ROM/Strength  Please see OT evaluation. Lower Extremity ROM / Strength   AROM WFL: Yes    BLE strength impaired, but not formally assessed with MMT. Grossly 3+/5 bilat based on demonstrated functional mobility.     Lower Extremity Sensation    WNL    Lower Extremity Proprioception:   WNL    Coordination and Tone  WNL    Balance  Sitting:  Good ; Supervision  Comments: limited trunk mobility 2/2 abd pain    Standing: Good - ; SBA  Comments: without AD; fwd flexed trunk 2/2 abd pain    Bed Mobility   Supine to Sit:    Supervision  Sit to Supine:   Supervision  Rolling:   CGA-Min A with use of bedrails, both directions   Scooting in sitting: Supervision  Scooting in supine:  Not Tested    Transfer Training     Sit to stand:   SBA  Stand to sit:   SBA  Bed to Chair:   Not Tested with use of N/A    Gait gait completed as indicated below  Distance:      20 ft + 20 ft  Deviations (firm surface/linoleum):  decreased wilfrido, forward flexed posture and decreased step length bilaterally  Assistive Device Used:    No AD - pt pushed IV pole for part of 2nd bout. Level of Assist:    SBA  Comment: mildly unsteady but no LOB. Stair Training deferred    Activity Tolerance   Pt completed therapy session with Pain noted with voiding on toilet    Positioning Needs   Pt in bed, alarm set, positioned in proper neutral alignment and pressure relief provided. Call light provided and all needs within reach    Exercises Initiated  Juancarlos deferred secondary to treatment focus on functional mobility  NA    Other  None. Patient/Family Education   Pt educated on role of inpatient PT, POC, importance of continued activity, DC recommendations, safety awareness and calling for assist with mobility. Assessment  Pt seen for Physical Therapy evaluation in acute care setting. Pt demonstrated decreased Activity tolerance, Balance and Strength as well as decreased independence with Ambulation, Bed Mobility  and Transfers. Pt will benefit from skilled PT in acute setting to promote activity tolerance and independent functional mobility. Recommending Home 24 hr assist and with home PT upon discharge as patient functioning below baseline level and would benefit from continued therapy services    Goals : To be met in 3 visits:  1). Independent with LE Ex x 10 reps    To be met in 6 visits:  1). Supine to/from sit: Independent  2). Sit to/from stand: Independent  3). Bed to chair: Independent  4). Gait: Ambulate 50 ft.  with  Supervision and use of No AD  5). Tolerate B LE exercises 3 sets of 10-15 reps  6).   Ascend/descend 1 steps with Supervision with use of hand rail unilateral and No AD    Rehabilitation Potential: Good  Strengths for achieving goals include:   Pt motivated, PLOF and Pt cooperative   Barriers to achieving goals include:    Complexity of condition    Plan    To be seen 3-5 x / week  while in acute care setting for therapeutic exercises, bed mobility, transfers, progressive gait training, balance training, and family/patient education. Signature: Charmayne Gent, PT, DPT    If patient discharges from this facility prior to next visit, this note will serve as the Discharge Summary.

## 2021-05-21 NOTE — PROGRESS NOTES
Admit: 2021    Name:  Wesley Melendez  Room:  [address] [place], [place] [address]  MRN:    [id number]    Daily Progress Note for 2021     I am signing off today      Partial SBO  Cecal mass - s/p right colectomy  Interval History:     Not passing gas yet     Scheduled Meds:   enoxaparin  40 mg Subcutaneous Q24H    famotidine (PEPCID) injection  20 mg Intravenous Daily       Continuous Infusions:   bupivacaine 0.5% 270 mL (21 1429)    sodium chloride 100 mL/hr at 21 0224       PRN Meds:  morphine **OR** morphine, acetaminophen, phenol, promethazine, ondansetron                  Objective:     Temp  Av.7 °F (36.5 °C)  Min: 97 °F (36.1 °C)  Max: 98.3 °F (36.8 °C)  Pulse  Av.8  Min: 70  Max: 92  BP  Min: 139/65  Max: 161/67  SpO2  Av %  Min: 96 %  Max: 98 %  Patient Vitals for the past 4 hrs:   BP Temp Temp src Pulse Resp SpO2   21 1030 (!) 161/67 97.6 °F (36.4 °C) Oral 75 16 97 %         Intake/Output Summary (Last 24 hours) at 2021 1051  Last data filed at 2021 2943  Gross per 24 hour   Intake 980 ml   Output 435 ml   Net 545 ml       Physical Exam:  Gen: No distress. Alert. Eyes: PERRL. No sclera icterus. No conjunctival injection. ENT: No discharge. Pharynx clear. NGT  Neck: Trachea midline. Resp: No accessory muscle use. No crackles. No wheezes. No rhonchi. CV: Regular rate. Regular rhythm. No murmur. No rub. No edema. GI: appropriately tender,. No hernia. Surgical dressing. Skin: Warm and dry. No nodule on exposed extremities. No rash on exposed extremities. M/S: No cyanosis. No joint deformity. No clubbing. Neuro: Awake. Grossly nonfocal    Psych: Oriented x 3. No anxiety or agitation.    Lab Data:  CBC:   Recent Labs     21  0527 21  0553   WBC 16.7* 10.4   RBC 3.92* 3.14*   HGB 11.7* 9.5*   HCT 36.0 28.9*   MCV 91.7 92.1   RDW 12.7 12.9    242     BMP:   Recent Labs     21  0527 21  0553 21  0559    135* 138   K 3.7 3. 1* 3.4*    104 105   CO2 22 24 26   PHOS 3.2  --   --    BUN 11 10 6*   CREATININE 0.7 <0.5* <0.5*     BNP: No results for input(s): BNP in the last 72 hours. PT/INR:   No results for input(s): PROTIME, INR in the last 72 hours. APTT:  No results for input(s): APTT in the last 72 hours. CARDIAC ENZYMES:   No results for input(s): CKMB, CKMBINDEX, TROPONINI in the last 72 hours. Invalid input(s): CKTOTAL;3  FASTING LIPID PANEL:No results found for: CHOL, HDL, TRIG  LIVER PROFILE:   Recent Labs     05/19/21  0527 05/20/21  0553 05/21/21  0559   * 64* 59*   ALT 95* 61* 53*   BILIDIR 0.7*  --   --    BILITOT 0.9 0.8 1.1*   ALKPHOS 329* 237* 286*           XR CHEST PORTABLE   Final Result   No radiographic evidence of acute pulmonary disease. CT a/p    Partial small bowel obstruction versus ileus. Jefferson Raja is moderate dilation of   the small bowel with transition at the terminal ileum.  The degree of small   bowel dilation is mildly increased since 05/09/2021. Neoplasm versus inflammation of the cecum/terminal ileum.  Mural thickening,   especially the ascending colon, is persistent. Stable multiple hepatic nodules and portacaval/gastrohepatic adenopathy. Small amount of ascites, perihepatic and perienteric, perhaps slightly   increased. Stable triangular-shaped 1.5 cm nodule in the left lung base,   inflammation/atelectasis versus metastasis. A. 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 - Radial margin involved. - Pericolonic lymph nodes positive for metastatic carcinoma with focal   extranodal extension (0/18). B. Liver nodule, biopsy:   - Positive for metastatic carcinoma.      Assessment & Plan:     Patient Active Problem List    Diagnosis Date Noted    Liver nodule     Ileus ([facility] [address]     Acute cystitis with hematuria    

## 2021-05-21 NOTE — FLOWSHEET NOTE
05/21/21 1030   Vital Signs   Temp 97.6 °F (36.4 °C)   Temp Source Oral   Pulse 75   Heart Rate Source Monitor   Resp 16   BP (!) 161/67   BP Location Left upper arm   Patient Position Semi fowlers   Level of Consciousness Alert (0)   MEWS Score 1   Patient Currently in Pain Yes   Pain Assessment   Pain Assessment 0-10   Pain Level 5   Pain Type Surgical pain   Pain Location Abdomen   Oxygen Therapy   SpO2 97 %   O2 Device None (Room air)   AM assessment completed, see flow sheet. Pt is alert and oriented. Vital signs are WNL. Respirations are even & easy. No complaints voiced. Pt. states she had a BM today and denies any n/v. Pt denies needs at this time. SR up x 2, and bed in low position. Call light is within reach. Bedside Mobility Assessment Tool (BMAT):     Assessment Level 1- Sit and Shake    1. From a semi-reclined position, ask patient to sit up and rotate to a seated position at the side of the bed. Can use the bedrail. 2. Ask patient to reach out and grab your hand and shake making sure patient reaches across his/her midline. Pass- Patient is able to come to a seated position, maintain core strength. Maintains seated balance while reaching across midline. Move on to Assessment Level 2. Assessment Level 2- Stretch and Point   1. With patient in seated position at the side of the bed, have patient place both feet on the floor (or stool) with knees no higher than hips. 2. Ask patient to stretch one leg and straighten the knee, then bend the ankle/flex and point the toes. If appropriate, repeat with the other leg. Pass- Patient is able to demonstrate appropriate quad strength on intended weight bearing limb(s). Move onto Assessment Level 3. Assessment Level 3- Stand   1. Ask patient to elevate off the bed or chair (seated to standing) using an assistive device (cane, bedrail). 2. Patient should be able to raise buttocks off be and hold for a count of five. May repeat once.    Pass- Patient maintains standing stability for at least 5 seconds, proceed to assessment level 4. Assessment Level 4- Walk   1. Ask patient to march in place at bedside. 2. Then ask patient to advance step and return each foot. Some medical conditions may render a patient from stepping backwards, use your best clinical judgement. Pass- Patient demonstrates balance while shifting weight and ability to step, takes independent steps, does not use assistive device patient is MOBILITY LEVEL 4. Mobility Level- 4    Patient is able to demonstrate the ability to move from a reclining position to an upright position within the recliner.

## 2021-05-21 NOTE — CARE COORDINATION
INTERDISCIPLINARY PLAN OF CARE CONFERENCE    Date/Time: 5/21/2021 4:37 PM  Completed by: Niya Matthew RN, Case Management      Patient Name:  Noel Castleman  YOB: 1935  Admitting Diagnosis: Small bowel obstruction ([facility] [address] [U16.233]     Admit Date/Time:  5/17/2021 11:45 AM    Chart reviewed. Interdisciplinary team contacted or reviewed plan related to patient progress and discharge plans. Disciplines included Case Management, Nursing, and Dietitian. Current Status: Stable  PT/OT recommendation for discharge plan of care: Home 24 hr assist  and Home PT    Expected D/C Disposition:  Home  Confirmed plan with patientYes   Met with: Patient  Discharge Plan Comments:  Reviewed chart and attempted to meet with pt who is sleeping. Will cont plan for return home with Son. Will follow for Mary Lujan needs at [facility].       Home O2 in place on admit: No  Pt informed of need to bring portable home O2 tank on day of discharge for nursing to connect prior to leaving:  Not Indicated  Verbalized agreement/Understanding:  Not Indicated

## 2021-05-21 NOTE — DISCHARGE INSTR - COC
Discharging to Facility/ Agency   · Name:   · Address:  · Phone:  · Fax:      / signature: {Esignature:[id number]:::0}    PHYSICIAN SECTION    Prognosis: Good    Condition at Discharge: Stable    Rehab Potential (if transferring to Rehab): Good    Recommended Labs or Other Treatments After Discharge: PT/OT    Physician Certification: I certify the above information and transfer of Aria Ramirez  is necessary for the continuing treatment of the diagnosis listed and that she requires Home Care for less 30 days.      Update Admission H&P: No change in H&P    PHYSICIAN SIGNATURE:  Electronically signed by LUCIEN Carl CNP on 5/21/21 at 11:14 AM EDT

## 2021-05-22 LAB
GLUCOSE BLD-MCNC: 89 MG/DL (ref 70–99)
GLUCOSE BLD-MCNC: 92 MG/DL (ref 70–99)
GLUCOSE BLD-MCNC: 96 MG/DL (ref 70–99)
PERFORMED ON: NORMAL

## 2021-05-22 PROCEDURE — 1200000000 HC SEMI PRIVATE

## 2021-05-22 PROCEDURE — 2580000003 HC RX 258: Performed by: NURSE PRACTITIONER

## 2021-05-22 PROCEDURE — 6370000000 HC RX 637 (ALT 250 FOR IP): Performed by: NURSE PRACTITIONER

## 2021-05-22 PROCEDURE — 2500000003 HC RX 250 WO HCPCS: Performed by: SURGERY

## 2021-05-22 PROCEDURE — 6360000002 HC RX W HCPCS: Performed by: SURGERY

## 2021-05-22 PROCEDURE — 99024 POSTOP FOLLOW-UP VISIT: CPT | Performed by: SURGERY

## 2021-05-22 RX ADMIN — FAMOTIDINE 20 MG: 10 INJECTION, SOLUTION INTRAVENOUS at 20:18

## 2021-05-22 RX ADMIN — ENOXAPARIN SODIUM 40 MG: 40 INJECTION SUBCUTANEOUS at 20:18

## 2021-05-22 RX ADMIN — SODIUM CHLORIDE: 9 INJECTION, SOLUTION INTRAVENOUS at 10:08

## 2021-05-22 RX ADMIN — OXYCODONE HYDROCHLORIDE 10 MG: 5 TABLET ORAL at 14:34

## 2021-05-22 ASSESSMENT — PAIN SCALES - GENERAL
PAINLEVEL_OUTOF10: 3
PAINLEVEL_OUTOF10: 0
PAINLEVEL_OUTOF10: 8
PAINLEVEL_OUTOF10: 0

## 2021-05-22 ASSESSMENT — PAIN DESCRIPTION - FREQUENCY: FREQUENCY: INTERMITTENT

## 2021-05-22 ASSESSMENT — PAIN DESCRIPTION - PROGRESSION: CLINICAL_PROGRESSION: GRADUALLY IMPROVING

## 2021-05-22 ASSESSMENT — PAIN DESCRIPTION - LOCATION: LOCATION: ABDOMEN

## 2021-05-22 ASSESSMENT — PAIN DESCRIPTION - ORIENTATION: ORIENTATION: MID

## 2021-05-22 NOTE — FLOWSHEET NOTE
05/21/21 2100   Vital Signs   Temp 97.2 °F (36.2 °C)   Temp Source Oral   Pulse 81   Heart Rate Source Monitor   Resp 16   BP (!) 162/69   BP Location Left upper arm   Patient Position Semi fowlers   Level of Consciousness Alert (0)   MEWS Score 1   Patient Currently in Pain Yes   Pain Assessment   Pain Assessment 0-10   Pain Level 7   Pain Type Surgical pain   Pain Location Abdomen   Pain Orientation Mid   Patient's Stated Pain Goal No pain   Pain Descriptors Discomfort;Aching   Pain Frequency Continuous   Pain Onset On-going   Clinical Progression Not changed   Functional Pain Assessment Prevents or interferes some active activities and ADLs   Oxygen Therapy   SpO2 97 %   O2 Device None (Room air)   PM assessment complete, see the flow sheet. Pt. Evening meds were given, see the MAR. Pt. In bed with side rails x2 and call light in reach.

## 2021-05-22 NOTE — PROGRESS NOTES
Occupational/Physical Therapy Attempt   Attempted occupational/physical therapy, however patient just had visitors arrive and reports she sat up this morning in a chair requesting therapy to come back later in the afternoon. Will attempt again as able.      Terra Avendano, OTR/L [id number]  Andrea Li, PT, DPT, OMT-C  #[id number]

## 2021-05-22 NOTE — PLAN OF CARE
Problem: Pain:  Goal: Patient's pain/discomfort is manageable  Description: Patient's pain/discomfort is manageable  Outcome: Ongoing   Pain med once today, no further complaints  Problem: Falls - Risk of:  Goal: Will remain free from falls  Description: Will remain free from falls  Outcome: Ongoing   Uses call light appropriately, bed alarm on for safety.

## 2021-05-22 NOTE — OP NOTE
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 OPERATIONS PERFORMED:  1. Right colectomy. 2.  Wedge excisional biopsy of the right lobe of the liver. SURGEON:  Aguila Patel MD    ANESTHESIA:  General.    COMPLICATIONS:  None. ESTIMATED BLOOD LOSS:  50 mL. INDICATIONS FOR OPERATION:  An 80-year-old female previously healthy who  presented with abdominal pain. She was identified with a cecal mass. She has now developed a small bowel obstruction due to this mass. She  has suspected liver metastasis. This was all explained to the patient. I recommended operative intervention for palliation of her obstruction. The patient understood the risks and benefits and wanted to proceed. DESCRIPTION OF OPERATION:  The patient was brought to the operating  room. General anesthesia was induced. She was prepped and draped in  usual surgical sterile fashion. Midline incision was made. The  peritoneal cavity was entered. There was a very large, bulky tumor in  the right lower quadrant. There were implants of tumor down in the  pelvis consistent with carcinomatosis. The small bowel was dilated  consistent with known small bowel obstruction. I was able to mobilize  the mass up off the retroperitoneum. I divided the distal small bowel  and proximal transverse colon.   A combination of silk suture ligatures  and the LigaSure device were used to control the mesentery. Specimen  was passed off. I lined the two remaining ends and used a reload of  SHARON-75 stapler to do a side-to-side, but functional end-to-end  anastomosis. The end-hole was closed with a TX stapler. Mesenteric  defect was closed with silk sutures. Staple lines were oversewn with  silk sutures. The duodenum was not involved in this locally advanced  tumor. There were palpable liver masses. A small wedge of the right  lobe of the liver was performed to get a piece of one of the liver  masses for tissue diagnosis. At this point, hemostasis was confirmed. A dual-lumen On-Q PainBuster system was placed on both sides of the  incision. A drain was placed in the right paracolic gutter just because  of all the raw surface and dissection was necessary to get this bulky,  large tumor up and off the retroperitoneum. The midline fascia was  closed with #1 Prolene starting superiorly and inferiorly and tying in  the middle. Skin clips were used to reapproximate the skin. Dressings  were placed. DISPOSITION:  The patient tolerated the procedure without any acute  complications.         Lyly Gaytan MD    D: 05/22/2021 17:22:42       T: 05/22/2021 17:34:32     [id number]  Job#: [id number]     Doc#: [id number]    CC:

## 2021-05-22 NOTE — PROGRESS NOTES
.Patient is not able to demonstrate the ability to move from a reclining position to an upright position within the recliner due to abdominal surgery. But was up a in chair, and using call light appropriately. .     Pt a/o. Am assessment completed see flow sheet. Pt denies any pain/ needs at this time. Pt resting in bed quietly at this time. Denies any needs. All needs met and call light within reach.

## 2021-05-22 NOTE — PROGRESS NOTES
[facility] GENERAL SURGERY    Surgery Progress Note           POD # 4    PATIENT NAME: Michelle Toney     TODAY'S DATE: 5/22/2021    INTERVAL HISTORY:    Pt feels well and wants to go home. However, she has not ambulated yet. She only sat up in the chair once. OBJECTIVE:   VITALS:  BP (!) 156/69   Pulse 79   Temp 97.5 °F (36.4 °C) (Oral)   Resp 16   Ht 5' (1.524 m)   Wt 128 lb 3.2 oz (58.2 kg)   SpO2 96%   BMI 25.04 kg/m²     INTAKE/OUTPUT:    I/O last 3 completed shifts:  In: -   Out: 225 [Drains:225]  I/O this shift:  In: -   Out: 90 [Drains:90]              CONSTITUTIONAL:  awake and alert  LUNGS:  clear to auscultation  ABDOMEN:   normal bowel sounds, soft, non-distended   INCISION: clean, dry    Data:  CBC:   Recent Labs     05/20/21  0553 05/21/21  0555   WBC 10.4  --    HGB 9.5* 10.1*   HCT 28.9* 30.5*     --      BMP:    Recent Labs     05/20/21  0553 05/21/21  0559   * 138   K 3.1* 3.4*    105   CO2 24 26   BUN 10 6*   CREATININE <0.5* <0.5*   GLUCOSE 94 102*     Hepatic:   Recent Labs     05/20/21  0553 05/21/21  0559   AST 64* 59*   ALT 61* 53*   BILITOT 0.8 1.1*   ALKPHOS 237* 286*     Mag:    No results for input(s): MG in the last 72 hours. Phos:   No results for input(s): PHOS in the last 72 hours. INR: No results for input(s): INR in the last 72 hours. Radiology Review: N/A    ASSESSMENT AND PLAN:  81 y.o. female status post right colectomy and liver biopsy. Encourage activity and I-S use. Okay for discharge once she is ambulating a little bit.   She will have home therapy and 24-hour care        Electronically signed by Lainey William MD

## 2021-05-23 VITALS
BODY MASS INDEX: 25.19 KG/M2 | WEIGHT: 128.3 LBS | RESPIRATION RATE: 16 BRPM | TEMPERATURE: 97.8 F | SYSTOLIC BLOOD PRESSURE: 142 MMHG | OXYGEN SATURATION: 97 % | DIASTOLIC BLOOD PRESSURE: 74 MMHG | HEIGHT: 60 IN | HEART RATE: 70 BPM

## 2021-05-23 LAB
GLUCOSE BLD-MCNC: 108 MG/DL (ref 70–99)
PERFORMED ON: ABNORMAL

## 2021-05-23 PROCEDURE — 99024 POSTOP FOLLOW-UP VISIT: CPT | Performed by: SURGERY

## 2021-05-23 PROCEDURE — 2580000003 HC RX 258: Performed by: NURSE PRACTITIONER

## 2021-05-23 RX ORDER — OXYCODONE HYDROCHLORIDE AND ACETAMINOPHEN 5; 325 MG/1; MG/1
1 TABLET ORAL EVERY 6 HOURS PRN
Qty: 20 TABLET | Refills: 0 | Status: SHIPPED | OUTPATIENT
Start: 2021-05-23 | End: 2021-05-28 | Stop reason: SDUPTHER

## 2021-05-23 RX ADMIN — SODIUM CHLORIDE: 9 INJECTION, SOLUTION INTRAVENOUS at 04:23

## 2021-05-23 ASSESSMENT — PAIN SCALES - GENERAL: PAINLEVEL_OUTOF10: 0

## 2021-05-23 NOTE — FLOWSHEET NOTE
05/22/21 2000   Vital Signs   Temp 97.1 °F (36.2 °C)   Temp Source Oral   Pulse 95   Heart Rate Source Monitor   Resp 16   /72   Patient Position Semi fowlers   Level of Consciousness Alert (0)   MEWS Score 1   Oxygen Therapy   SpO2 95 %   O2 Device None (Room air)   Pt. Resting in bed. Call light in reach. Shift assessment completed see flow sheet. Denies any needs at this time. Will continue to monitor.

## 2021-05-23 NOTE — PROGRESS NOTES
Prescriptions at [facility] and discharge instructions given. Pt verbalized understanding/ denies questions/ needs at this time. Transport called to transport pt to vehicle for discharge home.

## 2021-05-23 NOTE — PLAN OF CARE
Problem: Infection:  Goal: Will remain free from infection  Description: Will remain free from infection  Outcome: Ongoing     Problem: Safety:  Goal: Free from accidental physical injury  Description: Free from accidental physical injury  Outcome: Ongoing  Goal: Free from intentional harm  Description: Free from intentional harm  Outcome: Ongoing     Problem: Daily Care:  Goal: Daily care needs are met  Description: Daily care needs are met  Outcome: Ongoing     Problem: Pain:  Goal: Patient's pain/discomfort is manageable  Description: Patient's pain/discomfort is manageable  5/22/2021 2315 by Phoenix Starks RN  Outcome: Ongoing  5/22/2021 1728 by Ernestina Jasmine RN  Outcome: Ongoing  Goal: Pain level will decrease  Description: Pain level will decrease  Outcome: Ongoing  Goal: Control of acute pain  Description: Control of acute pain  Outcome: Ongoing  Goal: Control of chronic pain  Description: Control of chronic pain  Outcome: Ongoing     Problem: Skin Integrity:  Goal: Skin integrity will stabilize  Description: Skin integrity will stabilize  Outcome: Ongoing     Problem: Discharge Planning:  Goal: Patients continuum of care needs are met  Description: Patients continuum of care needs are met  Outcome: Ongoing     Problem: Falls - Risk of:  Goal: Will remain free from falls  Description: Will remain free from falls  5/22/2021 2315 by Phoenix Starks RN  Outcome: Ongoing  5/22/2021 1728 by Ernestina Jasmine RN  Outcome: Ongoing  Goal: Absence of physical injury  Description: Absence of physical injury  Outcome: Ongoing     Problem: Nutrition  Goal: Optimal nutrition therapy  Outcome: Ongoing

## 2021-05-23 NOTE — CARE COORDINATION
DISCHARGE ORDER  Date/Time 2021 11:17 AM  Completed by: Andrew Ernst RN, Case Management    Patient Name: Belen Monsalve      : 1935  Admitting Diagnosis: Small bowel obstruction ([facility] [address] [X35.251]      Admit order Date and Status:2021  (verify MD's last order for status of admission)      Noted discharge order. If applicable PT/OT recommendation at Discharge: Home 24 hr assist  and Home PT  DME recommendation by PT/OT:Needs Met  Confirmed discharge plan: Yes  with whom_pt and son at bedside______________  If pt confirmed DC plan does family need to be contacted by CM No if yes who______  Discharge Plan: Chart reviewed. Met with pt and son at bedside. Pt is returning home and son states he will provide 24/7 assistance. Discussed benefits of HHC and pt is refusing at this time. Pt has no PCP and is agreeable to [facility], contact information placed on dc instructions and pt aware. No further dc needs voiced or identified. Reviewed chart. Role of discharge planner explained and patient verbalized understanding. Discharge order is noted. Has Home O2 in place on admit:  No    Pt is being d/c'd to home today. Pt's O2 sats are 97% on RA. Discharge timeout done with Armaan Klein. All discharge needs and concerns addressed.

## 2021-05-23 NOTE — PROGRESS NOTES
PT. Refused to turn. Pt educated on risk of not turning such as devolvement of pressure ulcer. Pt. Still refused to turn.

## 2021-05-23 NOTE — DISCHARGE SUMMARY
colectomy for cecal mass. Uneventful postoperative course.   Home postoperative day #5      ARTI Alva MD    [address] Surgery

## 2021-05-24 ENCOUNTER — CARE COORDINATION (OUTPATIENT)
Dept: CASE MANAGEMENT | Age: 86
End: 2021-05-24

## 2021-05-24 NOTE — CARE COORDINATION
Patient contacted regarding COVID-19 risk. Discussed COVID-19 related testing which was available at this time. COVID-19 Not Detected on 2021. Patient informed of results, if available? Yes  Patient states she completed Moderna 2-step vaccination at the [facility] 2021. Call within 2 business days of discharge: Yes  Discharge Date: 21   RARS: Readmission Risk Score: 14      Care Transition Nurse contacted the patient by telephone to perform post discharge assessment. Verified name and  with patient as identifiers. Provided introduction to self, and explanation of the CTN role, and reason for call due to risk factors for infection and/or exposure to COVID-19. Symptoms reviewed with patient who verbalized the following symptoms: no new symptoms and no worsening symptoms. Due to no new or worsening symptoms encounter was not routed to provider for escalation. Discussed follow-up appointments. If no appointment was previously scheduled, appointment scheduling offered: Yes  [facility] follow up appointment(s):   Future Appointments   Date Time Provider Deneen Arreguin   2021  3:15 PM Vern White MD [place] [facility] follow up appointment(s): NA    Non-face-to-face services provided:  Obtained and reviewed discharge summary and/or continuity of care documents  Education of patient/family/caregiver/guardian to support self-management-s/s monitor  Assessment and support for treatment adherence and medication management-done    Advance Care Planning:    Does patient have an Advance Directive:  decision maker updated. Educated patient about risk for severe COVID-19 due to risk factors according to CDC guidelines. CTN reviewed discharge instructions, medical action plan and red flag symptoms patient who verbalized understanding. Discussed COVID vaccination status Yes. Education provided on COVID-19 vaccination as appropriate.  Discussed exposure

## 2021-05-25 ENCOUNTER — TELEPHONE (OUTPATIENT)
Dept: CASE MANAGEMENT | Age: 86
End: 2021-05-25

## 2021-05-25 ENCOUNTER — TELEPHONE (OUTPATIENT)
Dept: INTERNAL MEDICINE CLINIC | Age: 86
End: 2021-05-25

## 2021-05-25 NOTE — TELEPHONE ENCOUNTER
Tried calling Patient to make a hospital follow up visit. No answer and no VM set up. Other number in chart under \"Work\" was dsc.

## 2021-05-25 NOTE — TELEPHONE ENCOUNTER
Per Care Coordinator note 5/23/2021, pt agreeable to F/U with [facility]. Spoke with Fallon River RN with [facility]. She will reach out to pt for F/U to imaging report CT ABD PELVIS 5/17/2021.     Thank you,  Eduardo Galvan Lung Navigator  [phone]

## 2021-05-26 ENCOUNTER — TELEPHONE (OUTPATIENT)
Dept: SURGERY | Age: 86
End: 2021-05-26

## 2021-05-26 NOTE — TELEPHONE ENCOUNTER
Pt son called in asking for prescription of \"Magic mouth wash\" states pt has extremely dry mouth, tougne is sticking to roof of mouth. States she is uncomfortable and doesn't see any white residue in or around mouth. Pt was discharged on 05/23. Pt uses [facility] in [place].

## 2021-05-27 ENCOUNTER — TELEPHONE (OUTPATIENT)
Dept: INTERNAL MEDICINE CLINIC | Age: 86
End: 2021-05-27

## 2021-05-28 ENCOUNTER — TELEPHONE (OUTPATIENT)
Dept: CASE MANAGEMENT | Age: 86
End: 2021-05-28

## 2021-05-28 ENCOUNTER — TELEPHONE (OUTPATIENT)
Dept: SURGERY | Age: 86
End: 2021-05-28

## 2021-05-28 DIAGNOSIS — K63.89 COLONIC MASS: ICD-10-CM

## 2021-05-28 RX ORDER — OXYCODONE HYDROCHLORIDE AND ACETAMINOPHEN 5; 325 MG/1; MG/1
1 TABLET ORAL EVERY 6 HOURS PRN
Qty: 20 TABLET | Refills: 0 | Status: ON HOLD | OUTPATIENT
Start: 2021-05-28 | End: 2021-06-04 | Stop reason: HOSPADM

## 2021-05-28 NOTE — TELEPHONE ENCOUNTER
Certified letter  Mailed stressing the importance of F/U medical care to Imaging report CT ABD PELVIS AND INCIDENTAL FINDINGS.      [phone]    Thank you,  Jayce Callahan RN  [facility] Lung Navigator  [phone]

## 2021-06-02 ENCOUNTER — HOSPITAL ENCOUNTER (OUTPATIENT)
Age: 86
Discharge: HOME | DRG: 436 | End: 2021-06-04
Attending: INTERNAL MEDICINE | Admitting: INTERNAL MEDICINE
Payer: MEDICARE

## 2021-06-02 ENCOUNTER — OFFICE VISIT (OUTPATIENT)
Dept: SURGERY | Age: 86
End: 2021-06-02

## 2021-06-02 VITALS — DIASTOLIC BLOOD PRESSURE: 60 MMHG | SYSTOLIC BLOOD PRESSURE: 99 MMHG | HEART RATE: 114 BPM | TEMPERATURE: 97.1 F

## 2021-06-02 DIAGNOSIS — Z09 SURGICAL FOLLOW-UP CARE: Primary | ICD-10-CM

## 2021-06-02 PROBLEM — R17 JAUNDICE: Status: ACTIVE | Noted: 2021-06-02

## 2021-06-02 LAB
A/G RATIO: 0.6 (ref 1.1–2.2)
ALBUMIN SERPL-MCNC: 2.3 G/DL (ref 3.4–5)
ALP BLD-CCNC: 1807 U/L (ref 40–129)
ALT SERPL-CCNC: 153 U/L (ref 10–40)
ANION GAP SERPL CALCULATED.3IONS-SCNC: 11 MMOL/L (ref 3–16)
AST SERPL-CCNC: 308 U/L (ref 15–37)
BANDED NEUTROPHILS RELATIVE PERCENT: 1 % (ref 0–7)
BASOPHILS ABSOLUTE: 0 K/UL (ref 0–0.2)
BASOPHILS RELATIVE PERCENT: 0 %
BILIRUB SERPL-MCNC: 13.9 MG/DL (ref 0–1)
BILIRUBIN DIRECT: >10 MG/DL (ref 0–0.3)
BILIRUBIN, INDIRECT: ABNORMAL MG/DL (ref 0–1)
BUN BLDV-MCNC: 13 MG/DL (ref 7–20)
CALCIUM SERPL-MCNC: 8.5 MG/DL (ref 8.3–10.6)
CHLORIDE BLD-SCNC: 90 MMOL/L (ref 99–110)
CO2: 31 MMOL/L (ref 21–32)
CREAT SERPL-MCNC: <0.5 MG/DL (ref 0.6–1.2)
EOSINOPHILS ABSOLUTE: 0 K/UL (ref 0–0.6)
EOSINOPHILS RELATIVE PERCENT: 0 %
GFR AFRICAN AMERICAN: >60
GFR NON-AFRICAN AMERICAN: >60
GLOBULIN: 3.9 G/DL
GLUCOSE BLD-MCNC: 112 MG/DL (ref 70–99)
GLUCOSE BLD-MCNC: 93 MG/DL (ref 70–99)
HCT VFR BLD CALC: 35 % (ref 36–48)
HEMOGLOBIN: 11.6 G/DL (ref 12–16)
INR BLD: 1.97 (ref 0.86–1.14)
LYMPHOCYTES ABSOLUTE: 1.5 K/UL (ref 1–5.1)
LYMPHOCYTES RELATIVE PERCENT: 14 %
MAGNESIUM: 1.8 MG/DL (ref 1.8–2.4)
MCH RBC QN AUTO: 30.2 PG (ref 26–34)
MCHC RBC AUTO-ENTMCNC: 33.1 G/DL (ref 31–36)
MCV RBC AUTO: 91.1 FL (ref 80–100)
MONOCYTES ABSOLUTE: 0.6 K/UL (ref 0–1.3)
MONOCYTES RELATIVE PERCENT: 5 %
NEUTROPHILS ABSOLUTE: 8.9 K/UL (ref 1.7–7.7)
NEUTROPHILS RELATIVE PERCENT: 80 %
PDW BLD-RTO: 14.9 % (ref 12.4–15.4)
PERFORMED ON: NORMAL
PLATELET # BLD: 372 K/UL (ref 135–450)
PLATELET SLIDE REVIEW: ADEQUATE
PMV BLD AUTO: 9.1 FL (ref 5–10.5)
POTASSIUM REFLEX MAGNESIUM: 3.1 MMOL/L (ref 3.5–5.1)
PROTHROMBIN TIME: 23 SEC (ref 10–13.2)
RBC # BLD: 3.84 M/UL (ref 4–5.2)
SLIDE REVIEW: ABNORMAL
SODIUM BLD-SCNC: 132 MMOL/L (ref 136–145)
TOTAL PROTEIN: 6.2 G/DL (ref 6.4–8.2)
WBC # BLD: 11 K/UL (ref 4–11)

## 2021-06-02 PROCEDURE — 83735 ASSAY OF MAGNESIUM: CPT

## 2021-06-02 PROCEDURE — 6370000000 HC RX 637 (ALT 250 FOR IP): Performed by: PHYSICIAN ASSISTANT

## 2021-06-02 PROCEDURE — 85610 PROTHROMBIN TIME: CPT

## 2021-06-02 PROCEDURE — 99024 POSTOP FOLLOW-UP VISIT: CPT | Performed by: SURGERY

## 2021-06-02 PROCEDURE — 80053 COMPREHEN METABOLIC PANEL: CPT

## 2021-06-02 PROCEDURE — 1200000000 HC SEMI PRIVATE

## 2021-06-02 PROCEDURE — 2580000003 HC RX 258: Performed by: PHYSICIAN ASSISTANT

## 2021-06-02 PROCEDURE — 36415 COLL VENOUS BLD VENIPUNCTURE: CPT

## 2021-06-02 PROCEDURE — 85025 COMPLETE CBC W/AUTO DIFF WBC: CPT

## 2021-06-02 RX ORDER — MAGNESIUM SULFATE 1 G/100ML
1000 INJECTION INTRAVENOUS PRN
Status: DISCONTINUED | OUTPATIENT
Start: 2021-06-02 | End: 2021-06-04 | Stop reason: HOSPADM

## 2021-06-02 RX ORDER — POTASSIUM CHLORIDE 20 MEQ/1
40 TABLET, EXTENDED RELEASE ORAL ONCE
Status: DISCONTINUED | OUTPATIENT
Start: 2021-06-02 | End: 2021-06-04 | Stop reason: HOSPADM

## 2021-06-02 RX ORDER — OXYCODONE HYDROCHLORIDE 5 MG/1
5 TABLET ORAL EVERY 4 HOURS PRN
Status: DISCONTINUED | OUTPATIENT
Start: 2021-06-02 | End: 2021-06-04 | Stop reason: HOSPADM

## 2021-06-02 RX ORDER — SODIUM CHLORIDE 9 MG/ML
25 INJECTION, SOLUTION INTRAVENOUS PRN
Status: DISCONTINUED | OUTPATIENT
Start: 2021-06-02 | End: 2021-06-04 | Stop reason: HOSPADM

## 2021-06-02 RX ORDER — POLYETHYLENE GLYCOL 3350 17 G/17G
17 POWDER, FOR SOLUTION ORAL DAILY PRN
Status: DISCONTINUED | OUTPATIENT
Start: 2021-06-02 | End: 2021-06-04 | Stop reason: HOSPADM

## 2021-06-02 RX ORDER — SODIUM CHLORIDE 0.9 % (FLUSH) 0.9 %
5-40 SYRINGE (ML) INJECTION EVERY 12 HOURS SCHEDULED
Status: DISCONTINUED | OUTPATIENT
Start: 2021-06-02 | End: 2021-06-04 | Stop reason: HOSPADM

## 2021-06-02 RX ORDER — POTASSIUM CHLORIDE 750 MG/1
40 TABLET, EXTENDED RELEASE ORAL PRN
Status: DISCONTINUED | OUTPATIENT
Start: 2021-06-02 | End: 2021-06-04 | Stop reason: HOSPADM

## 2021-06-02 RX ORDER — ONDANSETRON 4 MG/1
4 TABLET, ORALLY DISINTEGRATING ORAL EVERY 8 HOURS PRN
Status: DISCONTINUED | OUTPATIENT
Start: 2021-06-02 | End: 2021-06-04 | Stop reason: HOSPADM

## 2021-06-02 RX ORDER — SODIUM CHLORIDE 0.9 % (FLUSH) 0.9 %
10 SYRINGE (ML) INJECTION PRN
Status: DISCONTINUED | OUTPATIENT
Start: 2021-06-02 | End: 2021-06-04 | Stop reason: HOSPADM

## 2021-06-02 RX ORDER — SODIUM CHLORIDE 9 MG/ML
INJECTION, SOLUTION INTRAVENOUS CONTINUOUS
Status: DISCONTINUED | OUTPATIENT
Start: 2021-06-02 | End: 2021-06-04 | Stop reason: HOSPADM

## 2021-06-02 RX ORDER — ONDANSETRON 2 MG/ML
4 INJECTION INTRAMUSCULAR; INTRAVENOUS EVERY 6 HOURS PRN
Status: DISCONTINUED | OUTPATIENT
Start: 2021-06-02 | End: 2021-06-04 | Stop reason: HOSPADM

## 2021-06-02 RX ORDER — POTASSIUM CHLORIDE 7.45 MG/ML
10 INJECTION INTRAVENOUS PRN
Status: DISCONTINUED | OUTPATIENT
Start: 2021-06-02 | End: 2021-06-04 | Stop reason: HOSPADM

## 2021-06-02 RX ADMIN — POTASSIUM BICARBONATE 40 MEQ: 782 TABLET, EFFERVESCENT ORAL at 23:28

## 2021-06-02 RX ADMIN — SODIUM CHLORIDE: 9 INJECTION, SOLUTION INTRAVENOUS at 17:10

## 2021-06-02 ASSESSMENT — PAIN SCALES - GENERAL
PAINLEVEL_OUTOF10: 0
PAINLEVEL_OUTOF10: 0

## 2021-06-02 NOTE — PLAN OF CARE
Direct admit from general surgery office  -85F recently admitted with small bowel obstruction 2/2 colonic mass. Underwent right colectomy. 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 Results for Aletha Roberson (MRN [id number]) as of 6/2/2021 17:37   Ref.  Range 6/2/2021 16:40   Alk Phos Latest Ref Range: 40 - 129 U/L 1,807 (H)   ALT Latest Ref Range: 10 - 40 U/L 153 (H)   AST Latest Ref Range: 15 - 37 U/L 308 (H)   Bilirubin Latest Ref Range: 0.0 - 1.0 mg/dL 13.9 (H)   Total Protein Latest Ref Range: 6.4 - 8.2 g/dL 6.2 (L)     Consult hem/onc and GI  Check RUQ US

## 2021-06-02 NOTE — PROGRESS NOTES
Pt history obtained from pt's son Lamont Abdi r/t pt tired and weakness. Pt is A/O x4. Patient admitted to room  212  from Dr. Elmo ly. Patient oriented to room, call light, bed rails, phone, lights and bathroom. Patient instructed about the schedule of the day including: vital sign frequency, lab draws, possible tests, frequency of MD and staff rounds, daily weights, I &O's and prescribed diet. Bed alarm deferred patient low fall risk and refuses alarm. Bed locked, in lowest position, side rails up 2/4, call light within reach. Recliner Assessment:   Patient is not able to demonstrate the ability to move from a reclining position to an upright position within the recliner due to weakness. and is not able to demonstrated the ability to move from a reclining position to an upright position within the recliner. however patient is alert, oriented and able to provide informed consent   ? ?  4 Eyes Skin Assessment:   The patient is being assess for   Admission  I agree that 2 RN's have performed a thorough Head to Toe Skin Assessment on the patient. ALL assessment sites listed below have been assessed. Areas assessed by both nurses:   Head, Face, and Ears   Shoulders, Back, and Chest, Abdomen  Arms, Elbows, and Hands   Coccyx, Sacrum, and Ischium  Legs, Feet, and Heels  Surgical incision dressing mid abdomen. Pt is extremely jaundice. **SHARE this note so that the co-signing nurse is able to place an eSignature**  Co-signer eSignature: Electronically signed by Danielle Kemp RN on 6/2/21 at 6:40 PM EDT  Does the Patient have Skin Breakdown?  Yes [facility] WOUND CARE was Initiated documentation include the June-wound, Wound Assessment, Measurements, Dressing Treatment, Drainage, and Color\",   Curt Prevention initiated: No   Wound Care Orders initiated: No  WOC nurse consulted for Pressure Injury (Stage 3,4, Unstageable, DTI, NWPT, Complex wounds)and New or Established Ostomies: No   Primary Nurse eSignature: Electronically signed by Sarah Lion RN on 6/2/21 at 6:36 PM EDT    Bedside Mobility Assessment Tool (BMAT):     Assessment Level 1- Sit and Shake    1. From a semi-reclined position, ask patient to sit up and rotate to a seated position at the side of the bed. Can use the bedrail. 2. Ask patient to reach out and grab your hand and shake making sure patient reaches across his/her midline. Fail- Patient is unable to perform tasks, patient is MOBILITY LEVEL 1. Assessment Level 2- Stretch and Point   1. With patient in seated position at the side of the bed, have patient place both feet on the floor (or stool) with knees no higher than hips. 2. Ask patient to stretch one leg and straighten the knee, then bend the ankle/flex and point the toes. If appropriate, repeat with the other leg. Fail- Patient is unable to complete task. Patient is MOBILITY LEVEL 2. Assessment Level 3- Stand   1. Ask patient to elevate off the bed or chair (seated to standing) using an assistive device (cane, bedrail). 2. Patient should be able to raise buttocks off be and hold for a count of five. May repeat once. Fail- Patient unable to demonstrate standing stability. Patient is MOBILITY LEVEL 3. Assessment Level 4- Walk   1. Ask patient to march in place at bedside. 2. Then ask patient to advance step and return each foot. Some medical conditions may render a patient from stepping backwards, use your best clinical judgement. Fail- Patient not able to complete tasks OR requires use of assistive device. Patient is MOBILITY LEVEL 3. Mobility Level- 2    ?

## 2021-06-02 NOTE — PROGRESS NOTES
Pt refused protocol rapid covid testing. Pt stating has had both vaccines and was just here 2 weeks ago and was negative 5/17 during that admission. Messaged Dr. Stefany Giron and let Maryann Humphreys Rn be aware. Awaiting further orders. 6/2/2021 1720 no need for rapid covid, order d/c'd and pt removed from isolation.

## 2021-06-02 NOTE — PROGRESS NOTES
Report given @ bedside to [facility], for transferral of care. Pt resting in bed. Call light in reach.

## 2021-06-03 ENCOUNTER — APPOINTMENT (OUTPATIENT)
Dept: GENERAL RADIOLOGY | Age: 86
DRG: 436 | End: 2021-06-03
Attending: INTERNAL MEDICINE
Payer: MEDICARE

## 2021-06-03 ENCOUNTER — APPOINTMENT (OUTPATIENT)
Dept: ULTRASOUND IMAGING | Age: 86
DRG: 436 | End: 2021-06-03
Attending: INTERNAL MEDICINE
Payer: MEDICARE

## 2021-06-03 PROBLEM — R74.01 TRANSAMINITIS: Status: ACTIVE | Noted: 2021-06-03

## 2021-06-03 PROBLEM — E87.6 HYPOKALEMIA: Status: ACTIVE | Noted: 2021-06-03

## 2021-06-03 PROBLEM — E87.1 HYPONATREMIA: Status: ACTIVE | Noted: 2021-06-03

## 2021-06-03 PROBLEM — C18.9 COLON CANCER METASTASIZED TO LIVER (HCC): Status: ACTIVE | Noted: 2021-06-03

## 2021-06-03 PROBLEM — C78.7 COLON CANCER METASTASIZED TO LIVER (HCC): Status: ACTIVE | Noted: 2021-06-03

## 2021-06-03 LAB
ALBUMIN SERPL-MCNC: 1.9 G/DL (ref 3.4–5)
ALP BLD-CCNC: 1677 U/L (ref 40–129)
ALT SERPL-CCNC: 145 U/L (ref 10–40)
AMMONIA: 38 UMOL/L (ref 11–51)
ANION GAP SERPL CALCULATED.3IONS-SCNC: 8 MMOL/L (ref 3–16)
ANISOCYTOSIS: ABNORMAL
AST SERPL-CCNC: 309 U/L (ref 15–37)
ATYPICAL LYMPHOCYTE RELATIVE PERCENT: 4 % (ref 0–6)
BACTERIA: ABNORMAL /HPF
BANDED NEUTROPHILS RELATIVE PERCENT: 1 % (ref 0–7)
BASOPHILS ABSOLUTE: 0 K/UL (ref 0–0.2)
BASOPHILS RELATIVE PERCENT: 0 %
BILIRUB SERPL-MCNC: 13 MG/DL (ref 0–1)
BILIRUBIN DIRECT: >10 MG/DL (ref 0–0.3)
BILIRUBIN URINE: ABNORMAL
BILIRUBIN, INDIRECT: ABNORMAL MG/DL (ref 0–1)
BLOOD, URINE: ABNORMAL
BUN BLDV-MCNC: 13 MG/DL (ref 7–20)
CALCIUM SERPL-MCNC: 8.2 MG/DL (ref 8.3–10.6)
CEA: 1546 NG/ML (ref 0–5)
CHLORIDE BLD-SCNC: 91 MMOL/L (ref 99–110)
CLARITY: ABNORMAL
CO2: 30 MMOL/L (ref 21–32)
COLOR: ABNORMAL
CREAT SERPL-MCNC: <0.5 MG/DL (ref 0.6–1.2)
EOSINOPHILS ABSOLUTE: 0 K/UL (ref 0–0.6)
EOSINOPHILS RELATIVE PERCENT: 0 %
EPITHELIAL CELLS, UA: ABNORMAL /HPF (ref 0–5)
FERRITIN: 1636 NG/ML (ref 15–150)
FOLATE: 9.74 NG/ML (ref 4.78–24.2)
GFR AFRICAN AMERICAN: >60
GFR NON-AFRICAN AMERICAN: >60
GLUCOSE BLD-MCNC: 132 MG/DL (ref 70–99)
GLUCOSE BLD-MCNC: 68 MG/DL (ref 70–99)
GLUCOSE BLD-MCNC: 84 MG/DL (ref 70–99)
GLUCOSE BLD-MCNC: 86 MG/DL (ref 70–99)
GLUCOSE URINE: NEGATIVE MG/DL
HCT VFR BLD CALC: 32.1 % (ref 36–48)
HEMOGLOBIN: 10.7 G/DL (ref 12–16)
HYPOCHROMIA: ABNORMAL
IRON SATURATION: 30 % (ref 15–50)
IRON: 40 UG/DL (ref 37–145)
KETONES, URINE: 15 MG/DL
LEUKOCYTE ESTERASE, URINE: ABNORMAL
LYMPHOCYTES ABSOLUTE: 1.6 K/UL (ref 1–5.1)
LYMPHOCYTES RELATIVE PERCENT: 12 %
MAGNESIUM: 1.9 MG/DL (ref 1.8–2.4)
MCH RBC QN AUTO: 30.2 PG (ref 26–34)
MCHC RBC AUTO-ENTMCNC: 33.4 G/DL (ref 31–36)
MCV RBC AUTO: 90.5 FL (ref 80–100)
MICROSCOPIC EXAMINATION: YES
MONOCYTES ABSOLUTE: 0.4 K/UL (ref 0–1.3)
MONOCYTES RELATIVE PERCENT: 4 %
MUCUS: ABNORMAL /LPF
NEUTROPHILS ABSOLUTE: 8.2 K/UL (ref 1.7–7.7)
NEUTROPHILS RELATIVE PERCENT: 79 %
NITRITE, URINE: NEGATIVE
PDW BLD-RTO: 15.1 % (ref 12.4–15.4)
PERFORMED ON: ABNORMAL
PERFORMED ON: ABNORMAL
PERFORMED ON: NORMAL
PH UA: 7 (ref 5–8)
PLATELET # BLD: 312 K/UL (ref 135–450)
PLATELET SLIDE REVIEW: ADEQUATE
PMV BLD AUTO: 9.3 FL (ref 5–10.5)
POIKILOCYTES: ABNORMAL
POLYCHROMASIA: ABNORMAL
POTASSIUM REFLEX MAGNESIUM: 3.5 MMOL/L (ref 3.5–5.1)
PROTEIN UA: ABNORMAL MG/DL
RBC # BLD: 3.54 M/UL (ref 4–5.2)
RBC UA: ABNORMAL /HPF (ref 0–4)
RENAL EPITHELIAL, UA: ABNORMAL /HPF (ref 0–1)
SLIDE REVIEW: ABNORMAL
SODIUM BLD-SCNC: 129 MMOL/L (ref 136–145)
SPECIFIC GRAVITY UA: 1.01 (ref 1–1.03)
TOTAL IRON BINDING CAPACITY: 135 UG/DL (ref 260–445)
TOTAL PROTEIN: 5.8 G/DL (ref 6.4–8.2)
URINE REFLEX TO CULTURE: YES
URINE TYPE: ABNORMAL
UROBILINOGEN, URINE: 1 E.U./DL
VITAMIN B-12: 1459 PG/ML (ref 211–911)
WBC # BLD: 10.2 K/UL (ref 4–11)
WBC UA: ABNORMAL /HPF (ref 0–5)

## 2021-06-03 PROCEDURE — 96365 THER/PROPH/DIAG IV INF INIT: CPT

## 2021-06-03 PROCEDURE — 86301 IMMUNOASSAY TUMOR CA 19-9: CPT

## 2021-06-03 PROCEDURE — 80048 BASIC METABOLIC PNL TOTAL CA: CPT

## 2021-06-03 PROCEDURE — 81001 URINALYSIS AUTO W/SCOPE: CPT

## 2021-06-03 PROCEDURE — 87086 URINE CULTURE/COLONY COUNT: CPT

## 2021-06-03 PROCEDURE — 96372 THER/PROPH/DIAG INJ SC/IM: CPT

## 2021-06-03 PROCEDURE — 74018 RADEX ABDOMEN 1 VIEW: CPT

## 2021-06-03 PROCEDURE — 82746 ASSAY OF FOLIC ACID SERUM: CPT

## 2021-06-03 PROCEDURE — 99233 SBSQ HOSP IP/OBS HIGH 50: CPT | Performed by: INTERNAL MEDICINE

## 2021-06-03 PROCEDURE — 2580000003 HC RX 258: Performed by: PHYSICIAN ASSISTANT

## 2021-06-03 PROCEDURE — 6370000000 HC RX 637 (ALT 250 FOR IP): Performed by: NURSE PRACTITIONER

## 2021-06-03 PROCEDURE — 83540 ASSAY OF IRON: CPT

## 2021-06-03 PROCEDURE — 36415 COLL VENOUS BLD VENIPUNCTURE: CPT

## 2021-06-03 PROCEDURE — 82607 VITAMIN B-12: CPT

## 2021-06-03 PROCEDURE — 82728 ASSAY OF FERRITIN: CPT

## 2021-06-03 PROCEDURE — 76705 ECHO EXAM OF ABDOMEN: CPT

## 2021-06-03 PROCEDURE — 82140 ASSAY OF AMMONIA: CPT

## 2021-06-03 PROCEDURE — 85025 COMPLETE CBC W/AUTO DIFF WBC: CPT

## 2021-06-03 PROCEDURE — 2500000003 HC RX 250 WO HCPCS: Performed by: HOSPITALIST

## 2021-06-03 PROCEDURE — 6360000002 HC RX W HCPCS: Performed by: PHYSICIAN ASSISTANT

## 2021-06-03 PROCEDURE — 82378 CARCINOEMBRYONIC ANTIGEN: CPT

## 2021-06-03 PROCEDURE — 1200000000 HC SEMI PRIVATE

## 2021-06-03 PROCEDURE — 80076 HEPATIC FUNCTION PANEL: CPT

## 2021-06-03 PROCEDURE — 83550 IRON BINDING TEST: CPT

## 2021-06-03 PROCEDURE — 83735 ASSAY OF MAGNESIUM: CPT

## 2021-06-03 RX ORDER — PHYTONADIONE 5 MG/1
5 TABLET ORAL ONCE
Status: COMPLETED | OUTPATIENT
Start: 2021-06-03 | End: 2021-06-03

## 2021-06-03 RX ORDER — LORAZEPAM 0.5 MG/1
0.5 TABLET ORAL EVERY 4 HOURS PRN
Status: DISCONTINUED | OUTPATIENT
Start: 2021-06-03 | End: 2021-06-04 | Stop reason: HOSPADM

## 2021-06-03 RX ORDER — SALIVA STIMULANT COMB. NO.3
SPRAY, NON-AEROSOL (ML) MUCOUS MEMBRANE 4 TIMES DAILY
Status: DISCONTINUED | OUTPATIENT
Start: 2021-06-03 | End: 2021-06-04 | Stop reason: HOSPADM

## 2021-06-03 RX ORDER — DEXTROSE MONOHYDRATE 50 MG/ML
100 INJECTION, SOLUTION INTRAVENOUS PRN
Status: DISCONTINUED | OUTPATIENT
Start: 2021-06-03 | End: 2021-06-04 | Stop reason: HOSPADM

## 2021-06-03 RX ORDER — NICOTINE POLACRILEX 4 MG
15 LOZENGE BUCCAL PRN
Status: DISCONTINUED | OUTPATIENT
Start: 2021-06-03 | End: 2021-06-04 | Stop reason: HOSPADM

## 2021-06-03 RX ORDER — DEXTROSE MONOHYDRATE 25 G/50ML
12.5 INJECTION, SOLUTION INTRAVENOUS PRN
Status: DISCONTINUED | OUTPATIENT
Start: 2021-06-03 | End: 2021-06-04 | Stop reason: HOSPADM

## 2021-06-03 RX ORDER — LANOLIN ALCOHOL/MO/W.PET/CERES
3 CREAM (GRAM) TOPICAL NIGHTLY
Status: DISCONTINUED | OUTPATIENT
Start: 2021-06-03 | End: 2021-06-04 | Stop reason: HOSPADM

## 2021-06-03 RX ADMIN — Medication 3 MG: at 20:19

## 2021-06-03 RX ADMIN — PHYTONADIONE 5 MG: 5 TABLET ORAL at 18:40

## 2021-06-03 RX ADMIN — SODIUM CHLORIDE: 9 INJECTION, SOLUTION INTRAVENOUS at 18:45

## 2021-06-03 RX ADMIN — Medication: at 20:27

## 2021-06-03 RX ADMIN — DEXTROSE MONOHYDRATE 12.5 G: 25 INJECTION, SOLUTION INTRAVENOUS at 20:26

## 2021-06-03 RX ADMIN — Medication 10 ML: at 20:22

## 2021-06-03 RX ADMIN — CEFTRIAXONE SODIUM 1000 MG: 1 INJECTION, POWDER, FOR SOLUTION INTRAMUSCULAR; INTRAVENOUS at 11:53

## 2021-06-03 RX ADMIN — SODIUM CHLORIDE: 9 INJECTION, SOLUTION INTRAVENOUS at 06:37

## 2021-06-03 RX ADMIN — ENOXAPARIN SODIUM 40 MG: 40 INJECTION SUBCUTANEOUS at 09:49

## 2021-06-03 RX ADMIN — Medication: at 12:01

## 2021-06-03 ASSESSMENT — PAIN DESCRIPTION - ORIENTATION: ORIENTATION: RIGHT;UPPER

## 2021-06-03 ASSESSMENT — PAIN SCALES - GENERAL
PAINLEVEL_OUTOF10: 0
PAINLEVEL_OUTOF10: 0
PAINLEVEL_OUTOF10: 3

## 2021-06-03 ASSESSMENT — PAIN DESCRIPTION - ONSET: ONSET: GRADUAL

## 2021-06-03 ASSESSMENT — PAIN DESCRIPTION - PAIN TYPE: TYPE: ACUTE PAIN

## 2021-06-03 ASSESSMENT — PAIN DESCRIPTION - PROGRESSION: CLINICAL_PROGRESSION: GRADUALLY WORSENING

## 2021-06-03 ASSESSMENT — PAIN DESCRIPTION - LOCATION: LOCATION: ABDOMEN

## 2021-06-03 NOTE — PLAN OF CARE
Problem: Falls - Risk of:  Goal: Will remain free from falls  Description: Will remain free from falls  6/3/2021 1047 by Nir Li RN  Outcome: Ongoing  6/3/2021 0932 by Nir Li RN  Outcome: Ongoing  6/3/2021 0037 by Adolfo Benitez RN  Outcome: Ongoing  Goal: Absence of physical injury  Description: Absence of physical injury  6/3/2021 0037 by Adolfo Benitez RN  Outcome: Ongoing     Problem: Infection:  Goal: Will remain free from infection  Description: Will remain free from infection  6/3/2021 0037 by Adolfo Benitez RN  Outcome: Ongoing     Problem: Safety:  Goal: Free from accidental physical injury  Description: Free from accidental physical injury  6/3/2021 0037 by Adolfo Benitez RN  Outcome: Ongoing  Goal: Free from intentional harm  Description: Free from intentional harm  6/3/2021 0037 by Adolfo Benitez RN  Outcome: Ongoing     Problem: Daily Care:  Goal: Daily care needs are met  Description: Daily care needs are met  6/3/2021 1047 by Nir Li RN  Outcome: Ongoing  6/3/2021 0932 by Nir Li RN  Outcome: Ongoing  6/3/2021 0037 by Adolfo Benitez RN  Outcome: Ongoing     Problem: Pain:  Goal: Patient's pain/discomfort is manageable  Description: Patient's pain/discomfort is manageable  6/3/2021 0932 by Nir Li RN  Outcome: Ongoing  6/3/2021 0037 by Adolfo Benitez RN  Outcome: Ongoing     Problem: Skin Integrity:  Goal: Skin integrity will stabilize  Description: Skin integrity will stabilize  6/3/2021 0037 by Adolfo Benitez RN  Outcome: Ongoing     Problem: Discharge Planning:  Goal: Patients continuum of care needs are met  Description: Patients continuum of care needs are met  6/3/2021 1047 by Nir Li RN  Outcome: Ongoing  6/3/2021 0037 by Adolfo Benitez RN  Outcome: Ongoing

## 2021-06-03 NOTE — FLOWSHEET NOTE
06/03/21 1946   Provider Notification   Reason for Communication Critical Value (comment)  (blood glucose 68. No hypoglycemic orders. )   Provider Name Lachelle Joe   Provider Notification Physician   Method of Communication Secure Message   Response Waiting for response   Notification Time 1943     Blood glucose 68. Patient NPO. Notified MD. Hypoglycemic protocol ordered.

## 2021-06-03 NOTE — CARE COORDINATION
Case Management Assessment  Initial Evaluation      Patient Name: Sarah Haji  YOB: 1935  Diagnosis: Jaundice [R17]  Date / Time: 6/2/2021  4:10 PM    Admission status/Date:  06/02/2021  Chart Reviewed: Yes      Patient Interviewed: Yes   Family Interviewed:  No      Hospitalization in the last 30 days:  No      Health Care Decision Maker :   Primary Decision Maker: Demetris Byrne Child - [phone]    Secondary Decision Maker: Manfred Booth Child - [phone]          Met with: patient and sons at bedside      Current PCP: [address] [place] [address] Medicare  Precert required for SNF : YES      3 night stay required - NA    ADLS  Support Systems/Care Needs: Children  Transportation: family    Meal Preparation: family    Housing  Living Arrangements: Lives w/son on 1st floor of 2 story home Steps: one  Intent for return to present living arrangements: Yes  Identified Issues:    Home Care Information  Active with [address] : No Agency:(Services)  Type of Home Care Services: None  Passport/Waiver : No  :                      Phone Number:    Passport/Waiver Services: NA          Durable Medical Equiptment   DME Provider:   Equipment:   Walker_X__Cane_X__RTS___ BSC___Shower Chair_X__Hospital Bed___W/C____Other________  02 at ____Liter(s)---wears(frequency)_______ HHN ___ CPAP___ BiPap___   N/A____      Home O2 Use :  No      Informed of need for care provider to bring portable home O2 tank on day of discharge for nursing to connect prior to leaving:   Not Indicated  Verbalized agreement/Understanding:   Not Indicated    Community Service Affiliation  Dialysis:  No    · Agency:  · Location:  · Dialysis Schedule:  · Phone:   · Fax: Other Community Services: (ex:PT/OT,Mental Health,Wound Clinic, Cardio/Pul [address])    DISCHARGE PLAN: Explained Case Management role/services. Chart reviewed.  Met with pt and sons at bedside and explained the role of the CM. Plan: TBD. Per son, caregiver Alma Miguel), Mel Krueger are not sure what the plan will be at this time until we meet with the doctors\". CM will follow and reassess as condition allows.  +CM following

## 2021-06-03 NOTE — FLOWSHEET NOTE
06/03/21 0737   Vital Signs   Temp 96.6 °F (35.9 °C)   Temp Source Oral   Pulse 76   Resp 16   /65   BP Location Left upper arm   Patient Position Semi fowlers   Level of Consciousness Alert (0)   MEWS Score 1   Patient Currently in Pain No   Pain Assessment   Pain Assessment 0-10   Pain Level 0   Oxygen Therapy   SpO2 94 %   O2 Device None (Room air)   AM assessment completed, see flow sheet. Pt is alert and oriented. Vital signs are WNL. Respirations are even & easy. Pt. States she feels weak. Pt. Appearance is jaundiced and overall weak. Pt denies needs at this time. SR up x 2, and bed in low position. Call light is within reach. Bedside Mobility Assessment Tool (BMAT):     Assessment Level 1- Sit and Shake    1. From a semi-reclined position, ask patient to sit up and rotate to a seated position at the side of the bed. Can use the bedrail. 2. Ask patient to reach out and grab your hand and shake making sure patient reaches across his/her midline. Pass- Patient is able to come to a seated position, maintain core strength. Maintains seated balance while reaching across midline. Move on to Assessment Level 2. Assessment Level 2- Stretch and Point   1. With patient in seated position at the side of the bed, have patient place both feet on the floor (or stool) with knees no higher than hips. 2. Ask patient to stretch one leg and straighten the knee, then bend the ankle/flex and point the toes. If appropriate, repeat with the other leg. Fail- Patient is unable to complete task. Patient is MOBILITY LEVEL 2. Assessment Level 3- Stand   1. Ask patient to elevate off the bed or chair (seated to standing) using an assistive device (cane, bedrail). 2. Patient should be able to raise buttocks off be and hold for a count of five. May repeat once. Fail- Patient unable to demonstrate standing stability. Patient is MOBILITY LEVEL 3. Assessment Level 4- Walk   1.  Ask patient to march in place at bedside. 2. Then ask patient to advance step and return each foot. Some medical conditions may render a patient from stepping backwards, use your best clinical judgement. Fail- Patient not able to complete tasks OR requires use of assistive device. Patient is MOBILITY LEVEL 3. Mobility Level- 2    Patient is not able to demonstrated the ability to move from a reclining position to an upright position within the recliner.  however patient is alert, oriented and able to provide informed consent

## 2021-06-03 NOTE — CONSULTS
Gastroenterology Consult Note    Patient:   Yakelin Naranjo   :    1935   Facility:   [facility]  Referring/PCP: Lucas Werner MD  Date:     6/3/2021  Consultant:   Debby Stevens PA-C      No chief complaint on file. History of Present illness   81year old female with a history of small bowel obstruction secondary to cecal mass and new diagnosis of colon cancer with liver metastasis s/p right colectomy on 2021 admitted with painless jaundice. She was evaluated by Dr Jose Holt yesterday for hospital follow up, and noted to be jaundiced. Her son states he first noticed yellowing of her skin five days ago. She admits to dry mouth, dysphagia, early satiety, and weight loss. She denies nausea, vomiting, heartburn, cramping, bloating, abdominal pain, change in bowel habits, rectal bleeding, and melena. She takes Ibuprofen as needed for stomach pain. She has never had an EGD or colonoscopy. GI was consulted for evaluation of metastatic colon cancer with suspected obstructive jaundice. Past Medical History:   Diagnosis Date    Cancer [facility])      Past Surgical History:   Procedure Laterality Date    APPENDECTOMY      COLECTOMY Right 2021    RIGHT COLECTOMY, LIVER BIOPSY     COLECTOMY N/A 2021    RIGHT COLECTOMY, LIVER BIOPSY performed by Julissa Hwang MD at [facility]         Social:   Social History     Tobacco Use    Smoking status: Never Smoker    Smokeless tobacco: Never Used   Substance Use Topics    Alcohol use: Never     Family: No family history on file. No current facility-administered medications on file prior to encounter. No current outpatient medications on file prior to encounter.       Infusions:    sodium chloride      sodium chloride 75 mL/hr at 21 0703     PRN Medications: sodium chloride flush, sodium chloride, potassium chloride **OR** potassium alternative oral replacement **OR** potassium chloride, magnesium sulfate, ondansetron **OR** ondansetron, polyethylene glycol, oxyCODONE  Allergies: No Known Allergies    ROS:   Constitutional: negative for chills, fevers and sweats  Eyes: negative for cataracts, and redness.  Positive for icterus   Ears, nose, mouth, throat, and face: negative for epistaxis, hearing loss and sore throat  Respiratory: negative for cough, hemoptysis and sputum  Cardiovascular: negative for chest pain, dyspnea and lower extremity edema  Gastrointestinal: as per HPI  Genitourinary:negative for dysuria, frequency and hematuria  Neurological: negative for coordination problems, dizziness and gait problems  Behavioral/Psych: negative for anxiety, depression and mood swings    Physical Exam   /65   Pulse 76   Temp 96.6 °F (35.9 °C) (Oral)   Resp 16   Ht 5' (1.524 m)   Wt 116 lb 2 oz (52.7 kg)   SpO2 94%   BMI 22.68 kg/m²       General appearance: alert, appears older than stated age, cooperative, fatigued, icteric and syndromic appearance - chronically ill appearing  Head: Normocephalic, without obvious abnormality, atraumatic  Eyes: negative findings: lids and lashes normal, positive findings: sclera icteric  Neck: no adenopathy and supple, symmetrical, trachea midline  Lungs: clear to auscultation bilaterally  Heart: regular rate and rhythm, S1, S2 normal, no murmur, click, rub or gallop  Abdomen: soft, non-tender; bowel sounds normal; no masses,  no organomegaly and surgical scars clean, dry, and dressed; abdomen icteric  Extremities: extremities normal, atraumatic, no cyanosis or edema    Lab and Imaging Review   Labs:  CBC:   Recent Labs     06/02/21  1640 06/03/21 0522   WBC 11.0 10.2   HGB 11.6* 10.7*   HCT 35.0* 32.1*   MCV 91.1 90.5    312     BMP:   Recent Labs     06/02/21  1640 06/03/21 0522   * 129*   K 3.1* 3.5   CL 90* 91*   CO2 31 30   BUN 13 13   CREATININE <0.5* <0.5*     LIVER PROFILE:   Recent Labs     06/02/21  1640 06/03/21 0522   * 309* * 145*   PROT 6.2* 5.8*   BILIDIR >10.0* >10.0*   BILITOT 13.9* 13.0*   ALKPHOS 1,807* 1,677*     PT/INR:   Recent Labs     06/02/21  1640   INR 1.97*       IMAGING:  CT ABDOMEN PELVIS W IV CONTRAST - 5/17/2021  Impression   Partial small bowel obstruction versus ileus. Lamona Jennifer is moderate dilation of   the small bowel with transition at the terminal ileum.  The degree of small   bowel dilation is mildly increased since 05/09/2021.       Neoplasm versus inflammation of the cecum/terminal ileum.  Mural thickening,   especially the ascending colon, is persistent.       Stable multiple hepatic nodules and portacaval/gastrohepatic adenopathy.       Small amount of ascites, perihepatic and perienteric, perhaps slightly   increased.       Stable triangular-shaped 1.5 cm nodule in the left lung base,   inflammation/atelectasis versus metastasis. US GALLBLADDER RUQ - 6/3/2021  Impression   1. Multiple scattered nonspecific hypoechoic nodules throughout the liver   parenchyma, concerning for metastatic disease.  See prior abdominal CT   studies for more details. 2. Contracted gallbladder, limiting its evaluation. 3. Unremarkable sonographic appearance of the common bile duct, without   evidence of ductal dilatation. 4. Unremarkable sonographic appearance of the right kidney and pancreas. 5. Small right pleural effusion. XR ABDOMEN (KUB) (SINGLE AP VIEW) - 6/3/2021  Impression   No air-filled dilated loops of bowel to suggest ileus or obstruction. Attending Supervising [de-identified] Attestation Statement  The patient is a 81 y.o. female. I have performed a history and physical examination of the patient. I discussed the case with my physician assistant Erick Mccarthy PA-C    I reviewed the patient's Past Medical History, Past Surgical History, Medications, and Allergies.      Physical Exam:  Vitals:    06/02/21 2026 06/03/21 0223 06/03/21 0737 06/03/21 1343   BP: 126/71 112/66 113/65 121/73 results.  Alternatively, consider hospice referral    Heber Bautista MD          [phone]  [phone]

## 2021-06-03 NOTE — H&P
Hospital Medicine History & Physical      PCP: Manasa Lemons MD    Date of Admission: 6/2/2021    Date of Service: Pt seen/examined on 6/2/2021 and Admitted to Inpatient with expected LOS greater than two midnights due to medical therapy. Chief Complaint:  jaundice      History Of Present Illness:       81 y.o. female recently admitted with new dx colon CA with liver metastasis, s/p R hemicolectmy. She was seen for outpatient follow up today by Surgery, was noted to be icteric and advised ER consultation. Since discharge patient denies difficulty swallowing, but has had continued poor appetite, PO intake with complaint of fatigue and weakness. She does recall onset of yellow skin discoloration 2-3 days ago, but denies fever, abdominal pain, vomiting, diarrhea. Past Medical History:          Diagnosis Date    Cancer [facility])        Past Surgical History:          Procedure Laterality Date    APPENDECTOMY      COLECTOMY Right 05/18/2021    RIGHT COLECTOMY, LIVER BIOPSY     COLECTOMY N/A 5/18/2021    RIGHT COLECTOMY, LIVER BIOPSY performed by Sydni Linares MD at [facility]         Medications Prior to Admission:      Prior to Admission medications    Medication Sig Start Date End Date Taking? Authorizing Provider   oxyCODONE-acetaminophen (ENDOCET) 5-325 MG per tablet Take 1 tablet by mouth every 6 hours as needed for Pain for up to 5 days. Intended supply: 5 days. Take lowest dose possible to manage pain 5/28/21 6/2/21 Yes Sydni Linares MD       Allergies:  Patient has no known allergies. Social History:           TOBACCO:   reports that she has never smoked. She has never used smokeless tobacco.  ETOH:   reports no history of alcohol use. Family History:           Denies premature CAD    REVIEW OF SYSTEMS:   Pertinent positives as noted in the HPI. All other systems reviewed and negative.     PHYSICAL EXAM PERFORMED:    /71   Pulse 77   Temp 98.1 °F (36.7 °C) (Oral)   Resp 14   Ht 5' (1.524 m)   Wt 116 lb 2 oz (52.7 kg)   SpO2 95%   BMI 22.68 kg/m²     General appearance:  No apparent distress, appears stated age and cooperative. HEENT:  Normal cephalic, atraumatic without obvious deformity. Pupils equal, round,  Extra ocular muscles intact. (+) icteric sclera  Neck: Supple, with full range of motion. No jugular venous distention. Trachea midline. Respiratory:  Normal respiratory effort. No use of accessory muscles, no intercostal retractions  Cardiovascular:  Regular rate and rhythm   Abdomen: Soft, non-tender, non-distended   Musculoskeletal:  No clubbing, cyanosis or edema bilaterally. No calf tenderness       Labs:     Recent Labs     06/02/21  1640   WBC 11.0   HGB 11.6*   HCT 35.0*        Recent Labs     06/02/21  1640   *   K 3.1*   CL 90*   CO2 31   BUN 13   CREATININE <0.5*   CALCIUM 8.5     Recent Labs     06/02/21  1640   *   *   BILIDIR >10.0*   BILITOT 13.9*   ALKPHOS 1,807*     Recent Labs     06/02/21  1640   INR 1.97*     No results for input(s): Ai Kras in the last 72 hours.     Urinalysis:      Lab Results   Component Value Date    NITRU Negative 05/17/2021    WBCUA 21-50 05/17/2021    BACTERIA 3+ 05/17/2021    RBCUA 3-4 05/17/2021    BLOODU SMALL 05/17/2021    SPECGRAV 1.025 05/17/2021    GLUCOSEU Negative 05/17/2021       Radiology:         [address]    (Results Pending)       ASSESSMENT:    Active Hospital Problems    Diagnosis Date Noted    Jaundice [R17] 06/02/2021         PLAN:    1) jaundice  - obs, secondary to met liver ds,ruq ordered  - IVFs  - NPO  - GI, Heme/Onc consultation    2) hypokalemia  - replace    DVT Prophylaxis: lovenox  Diet: Diet NPO Exceptions are: Sips of Water with Meds, Ice Chips, Sips of Clear Liquids  Code Status: Full Code         Dyan Connelly MD    Thank you Monica Maxwell MD for the opportunity to be involved in this patient's care. If you have any questions or concerns please feel free to contact me at [phone].

## 2021-06-03 NOTE — PROGRESS NOTES
Progress Note    Admit Date:  6/2/2021    Admitted for jaundice with recently dx metastatic colon CA s/p R hemicolectomy on last admission     Subjective:  Ms. Donovan Cordon denies any pain. Has a dry mouth and no appetite. Reports she is unsure if she wants a hospice consult but does ask that we speak with her children and would like to go home. Objective:   Patient Vitals for the past 4 hrs:   BP Temp Temp src Pulse Resp SpO2   06/03/21 0737 113/65 96.6 °F (35.9 °C) Oral 76 16 94 %        Intake/Output Summary (Last 24 hours) at 6/3/2021 1051  Last data filed at 6/3/2021 0703  Gross per 24 hour   Intake 979.25 ml   Output 100 ml   Net 879.25 ml       Physical Exam:  Gen: Elderly female, Alert. Eyes: PERRL. + sclera icterus. No conjunctival injection. ENT: No discharge. Pharynx clear. Neck:   Trachea midline. Resp: No accessory muscle use. No crackles. No wheezes. No rhonchi. CV: Regular rate. Regular rhythm. No murmur. No rub. No edema. Capillary Refill: Brisk,< 3 seconds   Peripheral Pulses: +2 palpable, equal bilaterally   GI: mild tenderness diffusely. Non-distended. Normal bowel sounds. Skin: Jaundice, midline abdominal incision with c/d/i dressings  M/S: No cyanosis. No joint deformity. No clubbing. Neuro: Awake. Grossly nonfocal    Psych: Oriented x 3. No anxiety or agitation.        Scheduled Meds:   melatonin  3 mg Oral Nightly    biotene dry mouth moisturizing   Oral 4x Daily    sodium chloride flush  5-40 mL Intravenous 2 times per day    enoxaparin  40 mg Subcutaneous Daily    potassium chloride  40 mEq Oral Once       Continuous Infusions:   sodium chloride      sodium chloride 75 mL/hr at 06/03/21 0703       PRN Meds:  LORazepam, sodium chloride flush, sodium chloride, potassium chloride **OR** potassium alternative oral replacement **OR** potassium chloride, magnesium sulfate, ondansetron **OR** ondansetron, polyethylene glycol, oxyCODONE      Data:  CBC:   Recent Labs 06/02/21 1640 06/03/21 0522   WBC 11.0 10.2   HGB 11.6* 10.7*   HCT 35.0* 32.1*   MCV 91.1 90.5    312     BMP:   Recent Labs     06/02/21 1640 06/03/21 0522   * 129*   K 3.1* 3.5   CL 90* 91*   CO2 31 30   BUN 13 13   CREATININE <0.5* <0.5*     LIVER PROFILE:   Recent Labs     06/02/21 1640 06/03/21 0522   * 309*   * 145*   BILIDIR >10.0* >10.0*   BILITOT 13.9* 13.0*   ALKPHOS 1,807* 1,677*     PT/INR:   Recent Labs     06/02/21 1640   PROTIME 23.0*   INR 1.97*       CULTURES  Urine Cx: pending      RADIOLOGY  XR ABDOMEN (KUB) (SINGLE AP VIEW)   Final Result   No air-filled dilated loops of bowel to suggest ileus or obstruction. US GALLBLADDER RUQ   Final Result   1. Multiple scattered nonspecific hypoechoic nodules throughout the liver   parenchyma, concerning for metastatic disease. See prior abdominal CT   studies for more details. 2. Contracted gallbladder, limiting its evaluation. 3. Unremarkable sonographic appearance of the common bile duct, without   evidence of ductal dilatation. 4. Unremarkable sonographic appearance of the right kidney and pancreas. 5. Small right pleural effusion. PRIOR RESULTS  FINAL DIAGNOSIS:     A. 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 - Radial margin involved. - Pericolonic lymph nodes positive for metastatic carcinoma with focal   extranodal extension (0/18). B. Liver nodule, biopsy:   - Positive for metastatic carcinoma.      Procedure:  Right hemicolectomy   Tumor Site: Ileocecal valve   Tumor Size: Greatest dimension (centimeter): 7        Additional dimensions (centimeters): 6 x 5   Macroscopic tumor perforation: Not identified   Histologic Type: Adenocarcinoma   Histologic Grade: G1 (moderately differentiated)   Tumor Extension:       Tumor directly invades adjacent structures (specify: Terminal ileum)                I Jens Gustafson MD have reviewed the chart on [facility]icencio and personally interviewed and examined patient, reviewed the data (labs and imaging) and after discussion with my PA formulated the plan. Agree with note with the following edits. HPI:     Patient is admitted to hospital with jaundice. She was recently diagnosed with colon cancer. It has metastasized to the liver. She had a right hemicolectomy during her last admission when she was admitted for a bowel obstruction. She denies any pain. Does not have a great appetite. Oncology has seen the patient. Work-up has revealed significant elevation of liver enzymes including bilirubin and alk phos. Ultrasound showed no intraductal obstruction. I reviewed the patient's Past Medical History, Past Surgical History, Medications, and Allergies. Physical exam:    /65   Pulse 76   Temp 96.6 °F (35.9 °C) (Oral)   Resp 16   Ht 5' (1.524 m)   Wt 116 lb 2 oz (52.7 kg)   SpO2 94%   BMI 22.68 kg/m²     Gen: No distress. Alert. Sick appearing  Eyes: PERRL. + sclera icterus. No conjunctival injection. ENT: No discharge. Pharynx clear. Neck: Trachea midline. Normal thyroid. Resp: No accessory muscle use. No crackles. No wheezes. No rhonchi. No dullness on percussion. CV: Regular rate. Regular rhythm. No murmur or rub. No edema. GI: Mild tenderness. non-distended. No masses. No organomegaly. Normal bowel sounds. No hernia. Assessment/Plan:    Jaundice  Transaminitis   Coagulopathy   - direct admit from surgeons office at hospital follow up appointment for new onset jaundice   -  liver mets from colon cancer (bili on day of d/c 1.1 ,now with jaundice and bili up to 13.9)  - GI consult: discission about possible palliative stent placement. I suspect extrinsic compression from tumor. MRCP ordered. She may benefit from palliative placement of a biliary stent.   - RUQ US shows multiple nodules without evidence of CBD dilation   - Hem/onc following      Hypokalemia  - K: 3.1  - PO replacement      Hyponatremia  - Poor PO intake   - IVF   - Na; 130 > 129    UTI  - UA with + LE,  WBC   - Urine Cx: pending   - Continue Rocephin D#1    Normocytic Anemia  - baseline hgb ~11   - B12/folate pending   - Iron studies pending     Stage IV Colon Cancer with liver mets  - Recently dx with SBO and cecal mass s/p R hemicolectomy and liver bx on 5/17  - hem/onc following-->has not seen patient on OP basis yet   - Per oncology: \" Due to age and burden of disease, likely to discuss hospice/palliative care. Discussed with patient today the stage of her cancer, the non curative nature of her disease, and the difficulty in giving her palliative chemo given her liver dysfunction. She would like to \"think and pray\" over her condition. \"  - Spoke with patients child Severo Yi, he plans to discuss options with his siblings and will let us know if they would like a hospice consult while IP    Insomnia   - Continue melatonin     Dry Mouth  - Biotene   - Continue IV hydration as well     DVT Prophylaxis: Lovenox   Diet: Diet NPO Exceptions are: Sips of Water with Meds, Ice Chips, Sips of Clear Liquids  Code Status: Full Code    LESLIE Pitts 10:51 AM 6/3/2021      Romaine Toussaint MD 6/3/2021 11:38 AM

## 2021-06-03 NOTE — PROGRESS NOTES
Attempted to give the pt. Her vitamin K and have her fill out the MRI questionnaire in which she stated she told the doctor she was not getting the MRI and that she was tired and did not want to take the medication at this moment.  Will re-attempt after the patient gets more rest.

## 2021-06-03 NOTE — CONSULTS
Hematology/Oncology Consultation         Patient:   Vidal Franco       Reason for Consult:  Colon cancer, liver failure    Requesting Physician: No ref. provider found    Chief Complaint:     No chief complaint on file. History Obtained from:     patient, electronic medical record    History of Present Illness:     Vidal Franco is a 81 y.o. female  with significant past medical history of newly diagnosed stage 4 colon cancer with liver mets who presents with jaundice. She was sent to the ED from her surgeons office. She was at the surgery office (Dr. Delano Borrego) yesterday to have her abd staples removed. She had a R colectomy on 5/17 and was released from hospital on POD 5. Path shows colonic adeno with mets to liver and no signs of MMR-D or MSI-H disease per IHC. We are asked to see this patient to determine next steps in her cancer care given stage 4 disease now with clinical jaundice and profound liver mets. NO signs of CBD obstruction on RUQ US. No BM since day of discharge per patient. No nausea or vomiting. She is not eating much and complains of dry mouth. No SOB or chest pain. No heart palpitations. No pain. She is getting IV hydration now and family at bedside. She would like to rest. Trouble sleeping.      Past Medical History:         Diagnosis Date    Cancer [facility])        Past Surgical History:         Procedure Laterality Date    APPENDECTOMY      COLECTOMY Right 05/18/2021    RIGHT COLECTOMY, LIVER BIOPSY     COLECTOMY N/A 5/18/2021    RIGHT COLECTOMY, LIVER BIOPSY performed by Estella Doll MD at [address] [place] [address]         Current Medications:     Current Facility-Administered Medications   Medication Dose Route Frequency Provider Last Rate Last Admin    sodium chloride flush 0.9 % injection 5-40 mL  5-40 mL Intravenous 2 times per day Meilssa Fischer PA-C        sodium chloride flush 0.9 % injection 10 mL  10 mL Intravenous PRN Lilian Dural ESDRAS Feng        0.9 % sodium chloride infusion  25 mL Intravenous PRN Lion Montemayor PA-C        potassium chloride (KLOR-CON M) extended release tablet 40 mEq  40 mEq Oral PRN LESLIE Gonzalez-C        Or    potassium bicarb-citric acid (EFFER-K) effervescent tablet 40 mEq  40 mEq Oral PRN Lion Montemayor, PA-C   40 mEq at 06/02/21 2328    Or    potassium chloride 10 mEq/100 mL IVPB (Peripheral Line)  10 mEq Intravenous PRN Lion Montemayor PA-C        magnesium sulfate 1000 mg in dextrose 5% 100 mL IVPB  1,000 mg Intravenous PRN Lion Montemayor, PA-C        enoxaparin (LOVENOX) injection 40 mg  40 mg Subcutaneous Daily DINORAH IbanezC   40 mg at 06/03/21 6020    ondansetron (ZOFRAN-ODT) disintegrating tablet 4 mg  4 mg Oral Q8H PRN DINORAH GonzalezC        Or    ondansetron (ZOFRAN) injection 4 mg  4 mg Intravenous Q6H PRN Lion Montemayor PA-C        polyethylene glycol (GLYCOLAX) packet 17 g  17 g Oral Daily PRN Lion Montemayor PA-C        0.9 % sodium chloride infusion   Intravenous Continuous Lion Montemayor PA-C 75 mL/hr at 06/03/21 0703 Rate Verify at 06/03/21 0703    oxyCODONE (ROXICODONE) immediate release tablet 5 mg  5 mg Oral Q4H PRN Lion Montemayor PA-C        potassium chloride (KLOR-CON M) extended release tablet 40 mEq  40 mEq Oral Once Lion Montemayor PA-C           Allergies:     No Known Allergies    Social History:     Social History     Socioeconomic History    Marital status:       Spouse name: Not on file    Number of children: Not on file    Years of education: Not on file    Highest education level: Not on file   Occupational History    Not on file   Tobacco Use    Smoking status: Never Smoker    Smokeless tobacco: Never Used   Vaping Use    Vaping Use: Never used   Substance and Sexual Activity    Alcohol use: Never    Drug use: Never    Sexual activity: Not Currently   Other Topics Concern    Not on file Social History Narrative    Not on file     Social Determinants of Health     Financial Resource Strain:     Difficulty of Paying Living Expenses:    Food Insecurity:     Worried About Running Out of Food in the Last Year:     [address] in the Last Year:    Transportation Needs:     Lack of Transportation (Medical):  Lack of Transportation (Non-Medical):    Physical Activity:     Days of Exercise per Week:     Minutes of Exercise per Session:    Stress:     Feeling of Stress :    Social Connections:     Frequency of Communication with Friends and Family:     Frequency of Social Gatherings with Friends and Family:     Attends [employer] Services:     Active Member of Clubs or Organizations:     Attends Club or Organization Meetings:     Marital Status:    Intimate Partner Violence:     Fear of Current or Ex-Partner:     Emotionally Abused:     Physically Abused:     Sexually Abused:      Social History     Substance and Sexual Activity   Drug Use Never     Social History     Substance and Sexual Activity   Alcohol Use Never     Social History     Substance and Sexual Activity   Sexual Activity Not Currently     Social History     Tobacco Use   Smoking Status Never Smoker   Smokeless Tobacco Never Used       Family History:     No family history on file. Review of Systems:     Constitutional: Denies fever, sweats, weight loss. Eyes: No visual changes or diplopia. No scleral icterus. ENT: No Headaches, no hearing loss, no  vertigo. No mouth sores or sore throat. Cardiovascular: No chest pain, no dyspnea on exertion, no palpitations, no  loss of consciousness. Respiratory: No cough, no  wheezing, no dyspnea, no sputum production. No hemoptysis. .    Gastrointestinal: SEE HPI   Genitourinary: No dysuria, trouble voiding, or hematuria. Musculoskeletal:  Generalized weakness. No joint complaints. Integumentary: see HPI   Neurological: No headache, diplopia.  No change in gait, balance, or coordination. No paresthesias. Endocrine: No temperature intolerance. No excessive thirst, fluid intake, or urination. Hematologic/Lymphatic: No abnormal bruising or ecchymoses, no blood clots or swollen lymph nodes. Allergic/Immunologic: No nasal congestion or hives. Physical Exam:     /65   Pulse 76   Temp 96.6 °F (35.9 °C) (Oral)   Resp 16   Ht 5' (1.524 m)   Wt 116 lb 2 oz (52.7 kg)   SpO2 94%   BMI 22.68 kg/m²     CONSTITUTIONAL: awake, alert, cooperative, no apparent distress. Appears chronically ill   EYES:  Pupils equal, round and reactive to light, sclera icteric, conjunctiva normal  ENT:  Normocephalic, without obvious abnormality, atraumatic, sinuses nontender on palpation, external ears without lesions, oral pharynx with moist mucus membranes, no mucositis. NECK:  Supple, symmetrical, trachea midline, no adenopathy, thyroid symmetric, not enlarged and no tenderness, skin normal  HEMATOLOGIC/LYMPHATICS:  no cervical lymphadenopathy, no supraclavicular lymphadenopathy, no axillary lymphadenopathy and no inguinal lymphadenopathy  BACK:  Symmetric, no curvature, spinous processes are non-tender on palpation, paraspinous muscles are non-tender on palpation, no costal vertebral tenderness  LUNGS:  Clear to auscultation bilaterally, no crackles or wheezing  CARDIOVASCULAR:  Regular rate and rhythm, normal S1 and S2, no S3 or S4, and no murmur noted  ABDOMEN:  HYPO  bowel sounds, soft, non-distended, non-tender, no masses palpated, no hepatosplenomegally; midline dressing is CDI   MUSCULOSKELETAL:  There is no redness, warmth, or swelling of the joints  NEUROLOGIC:   No focal findings. SKIN:  + jaundice   EXT: without clubbing, cyanosis or edema.       Data:     CBC:  Recent Labs     06/03/21  0522 06/02/21  1640 05/21/21  0555 05/20/21  0553   WBC 10.2 11.0  --  10.4   HGB 10.7* 11.6* 10.1* 9.5*   HCT 32.1* 35.0* 30.5* 28.9*   MCV 90.5 91.1  --  92.1    372  --  242 BMP:  Recent Labs     2122 21  1640 21  0559 21  0527   * 132* 138 136   K 3.5 3.1* 3.4* 3.7   CO2 30 31 26 22   BUN 13 13 6* 11   CREATININE <0.5* <0.5* <0.5* 0.7   MG 1.90 1.80  --  1.70*       HEPATIC:  Recent Labs     2122 21  1640 21  0559 21  0527   * 308* 59* 109*   * 153* 53* 95*   ALKPHOS 1,677* 1,807* 286* 329*   PROT 5.8* 6.2* 5.3* 5.5*   BILITOT 13.0* 13.9* 1.1* 0.9   BILIDIR >10.0* >10.0*  --  0.7*       TUMOR MARKERS:  No results for input(s): PSA, CEA, , KZ3003,  in the last 720 hours. MAGNESIUM:    Lab Results   Component Value Date    MG 1.90 2021       PT/INR:    No results found for: PTINR    Lab Results   Component Value Date    INR 1.97 2021       PTT:    Lab Results   Component Value Date    APTT 27.0 2021       U/A:    Lab Results   Component Value Date    COLORU BROWN 2021    PHUR 7.0 2021    WBCUA  2021    RBCUA 0-2 2021    MUCUS 1+ 2021    BACTERIA 2+ 2021    CLARITYU SL CLOUDY 2021    SPECGRAV 1.015 2021    LEUKOCYTESUR LARGE 2021    UROBILINOGEN 1.0 2021    BILIRUBINUR LARGE 2021    BLOODU TRACE-INTACT 2021    GLUCOSEU Negative 2021     Department of Pathology   ADDENDUM SURGICAL PATHOLOGY REPORT   Patient Name: Soo Emanuel       Accession No:  [id number]    Age Sex:   1935   81 Y / F       Location:      [facility]   Account No:   [de-identified]                 Collected:     2021   Med Rec No:    [id number]                Received:      2021   Attend Phys:   Westley Mancia DO         Completed:     2021   Perform Phys: Renata Pereira MD             ADDENDUM:   Addendum to report the result of immunohistochemical stains   for mismatch repair proteins performed on block A7.  The original   diagnosis signed by Dr. Rubin Amador on 2021 remains unchanged. COLON AND RECTUM CARCINOMA   Immunohistochemistry (IHC) Testing for Mismatch Repair (MMR) Proteins   RESULTS     MLH1:  Intact nuclear expression   MSH2:  Intact nuclear expression   MSH6:  Intact nuclear expression   PMS2:  Intact nuclear expression     Background nonneoplastic tissue/internal control with intact nuclear   expression     IHC Interpretation:  No loss of nuclear expression of MMR proteins: low   probability of microsatellite instability-high   (MSI-H)#     # There are exceptions to the above IHC interpretations. These results   should not be considered in isolation, and clinical correlation with   genetic counseling is recommended to assess the need for germline   testing. All immunohistochemical (IHC) stains were performed using single   antibodies on separate tissue sections.  No multiple antibody cocktails   were used unless specifically documented.  Appropriate positive controls   were performed with each antibody and the results were reviewed.  The   control stains showed the expected positive results within technically   acceptable parameters. Analyte specific reagent (ASR) disclaimer: The use of one or more   reagents in the above tests is regulated as an analyte specific reagent. These tests were developed and their performance characteristics   determined by the [facility]. They have   not been cleared by the . [address] and Drug Administration. The FDA has   determined that such clearance or approval is not necessary. Technical component is performed at [facility].   Professional interpretation is performed at [address] [place] [address] FIDELIA Harden   (Electronic Signature)   05/21/2021       FINAL DIAGNOSIS:     A. 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    - Radial margin involved. - Pericolonic lymph nodes positive for metastatic carcinoma with focal   extranodal extension (0/18). B. Liver nodule, biopsy:   - Positive for metastatic carcinoma. Procedure:  Right hemicolectomy   Tumor Site: Ileocecal valve   Tumor Size: Greatest dimension (centimeter): 7        Additional dimensions (centimeters): 6 x 5   Macroscopic tumor perforation: Not identified   Histologic Type: Adenocarcinoma   Histologic Grade: G1 (moderately differentiated)   Tumor Extension:       Tumor directly invades adjacent structures (specify: Terminal ileum)     MARGINS     Proximal Margin         Uninvolved by invasive carcinoma, high grade dysplasia /   intramucosal carcinoma, and low grade dysplasia       Distal Margin         Uninvolved by invasive carcinoma, high grade dysplasia /   intramucosal carcinoma, and low grade dysplasia       Radial (circumferential) or Mesenteric Margin        Involved by invasive carcinoma (tumor present 0-1 mm from margin)     Treatment Effect (applicable to carcinomas treated with neoadjuvant   therapy): No known pre surgical treatment   Lymph-Vascular Invasion: Not identified   Perineural Invasion: Not identified     + Tumor Budding (Note I)         Low score (0-4)     Type of Polyp in Which Invasive Carcinoma Arose: Not applicable   Tumor deposits: Present     Regional lymph nodes     Number of Lymph nodes Involved: 6     Number of Lymph Nodes Examined: 18     Pathologic Stage Classification (pTNM, AJCC 8th Edition)     Primary Tumor (pT):       pT4b: Tumor directly invades or adheres to adjacent organs or   structures (small bowel)     Regional Lymph Nodes (pN):      pN2a     Distant Metastasis (pM) (required only if confirmed pathologically in   this case)      pM1   Imagin. Multiple scattered nonspecific hypoechoic nodules throughout the liver   parenchyma, concerning for metastatic disease.  See prior abdominal CT   studies for more details.    2. Contracted gallbladder, limiting its evaluation. 3. Unremarkable sonographic appearance of the common bile duct, without   evidence of ductal dilatation. 4. Unremarkable sonographic appearance of the right kidney and pancreas. 5. Small right pleural effusion.             Impression:     Stage 4 colon cancer with liver mets   Liver failure   Anemia     Plan:     Stage 4 Colon CA with diffuse liver mets; [id number]   - Newly diagnosed s/p R colectomy for cecal mass on 5/18   - has not been seen in the office yet   - path shows NO signs of MMR def or MSI high   - Cannot give chemo in this setting of Bili 13 due to toxicity   - Cont supportive care  - Not amenable to stent as there is not a large tumor blocking the CBD - discussed with the GI PA at bedside.   - Due to age and burden of disease, likely to discuss hospice/palliative care. Discussed with patient today the stage of her cancer, the non curative nature of her disease, and the difficulty in giving her palliative chemo given her liver dysfunction. She would like to \"think and pray\" over her condition.   - Ammonia stable at 38     Anemia  - check iron, b12 and folate   - Hgb is 10.7; transfuse to keep Hgb over 7     Hx of post op ileus  - check KUB today, if negative can give low fiber diet. Insomnia  - PRN Ativan   - Melatonin     Dry Mouth  - Biotene   - due to age and polypharmacy   - no signs of thrush   - check B12 and folate levels     Addendum: Vitamin K 5 mg PO X 1 ordered for INR 1.9. MRCP ordered to further eval for extrinsic compression of the CBD to further assess if a palliative stent is viable. Thank you very much for allowing me to participate in the care of this patient.     Aldon Pallas, CNP   Medical Oncology/Hematology    [facility] - [address],  [address]  Phone: [phone]  Fax: [phone]

## 2021-06-04 ENCOUNTER — APPOINTMENT (OUTPATIENT)
Dept: MRI IMAGING | Age: 86
DRG: 436 | End: 2021-06-04
Attending: INTERNAL MEDICINE
Payer: MEDICARE

## 2021-06-04 VITALS
TEMPERATURE: 97.4 F | DIASTOLIC BLOOD PRESSURE: 74 MMHG | HEART RATE: 74 BPM | HEIGHT: 60 IN | RESPIRATION RATE: 16 BRPM | WEIGHT: 116.13 LBS | OXYGEN SATURATION: 96 % | SYSTOLIC BLOOD PRESSURE: 132 MMHG | BODY MASS INDEX: 22.8 KG/M2

## 2021-06-04 LAB
A/G RATIO: 0.5 (ref 1.1–2.2)
ALBUMIN SERPL-MCNC: 2 G/DL (ref 3.4–5)
ALP BLD-CCNC: 1834 U/L (ref 40–129)
ALT SERPL-CCNC: 155 U/L (ref 10–40)
ANION GAP SERPL CALCULATED.3IONS-SCNC: 11 MMOL/L (ref 3–16)
AST SERPL-CCNC: 324 U/L (ref 15–37)
BILIRUB SERPL-MCNC: 14.7 MG/DL (ref 0–1)
BUN BLDV-MCNC: 10 MG/DL (ref 7–20)
CALCIUM SERPL-MCNC: 8 MG/DL (ref 8.3–10.6)
CHLORIDE BLD-SCNC: 96 MMOL/L (ref 99–110)
CO2: 26 MMOL/L (ref 21–32)
CREAT SERPL-MCNC: <0.5 MG/DL (ref 0.6–1.2)
GFR AFRICAN AMERICAN: >60
GFR NON-AFRICAN AMERICAN: >60
GLOBULIN: 3.8 G/DL
GLUCOSE BLD-MCNC: 83 MG/DL (ref 70–99)
GLUCOSE BLD-MCNC: 87 MG/DL (ref 70–99)
GLUCOSE BLD-MCNC: 87 MG/DL (ref 70–99)
GLUCOSE BLD-MCNC: 88 MG/DL (ref 70–99)
GLUCOSE BLD-MCNC: 93 MG/DL (ref 70–99)
MAGNESIUM: 1.9 MG/DL (ref 1.8–2.4)
PERFORMED ON: NORMAL
POTASSIUM REFLEX MAGNESIUM: 3.1 MMOL/L (ref 3.5–5.1)
SODIUM BLD-SCNC: 133 MMOL/L (ref 136–145)
TOTAL PROTEIN: 5.8 G/DL (ref 6.4–8.2)
URINE CULTURE, ROUTINE: NORMAL

## 2021-06-04 PROCEDURE — 2580000003 HC RX 258: Performed by: PHYSICIAN ASSISTANT

## 2021-06-04 PROCEDURE — 6360000002 HC RX W HCPCS: Performed by: PHYSICIAN ASSISTANT

## 2021-06-04 PROCEDURE — 96366 THER/PROPH/DIAG IV INF ADDON: CPT

## 2021-06-04 PROCEDURE — 36415 COLL VENOUS BLD VENIPUNCTURE: CPT

## 2021-06-04 PROCEDURE — 83735 ASSAY OF MAGNESIUM: CPT

## 2021-06-04 PROCEDURE — 96372 THER/PROPH/DIAG INJ SC/IM: CPT

## 2021-06-04 PROCEDURE — 99239 HOSP IP/OBS DSCHRG MGMT >30: CPT | Performed by: INTERNAL MEDICINE

## 2021-06-04 PROCEDURE — 96375 TX/PRO/DX INJ NEW DRUG ADDON: CPT

## 2021-06-04 PROCEDURE — 74181 MRI ABDOMEN W/O CONTRAST: CPT

## 2021-06-04 PROCEDURE — 80053 COMPREHEN METABOLIC PANEL: CPT

## 2021-06-04 RX ORDER — LANOLIN ALCOHOL/MO/W.PET/CERES
3 CREAM (GRAM) TOPICAL NIGHTLY
Qty: 30 TABLET | Refills: 0 | Status: SHIPPED | OUTPATIENT
Start: 2021-06-04 | End: 2021-07-04

## 2021-06-04 RX ORDER — SALIVA STIMULANT COMB. NO.3
15 SPRAY, NON-AEROSOL (ML) MUCOUS MEMBRANE 4 TIMES DAILY
Qty: 1 BOTTLE | Refills: 0 | Status: SHIPPED | OUTPATIENT
Start: 2021-06-04

## 2021-06-04 RX ADMIN — CEFTRIAXONE SODIUM 1000 MG: 1 INJECTION, POWDER, FOR SOLUTION INTRAMUSCULAR; INTRAVENOUS at 11:10

## 2021-06-04 RX ADMIN — Medication 10 ML: at 07:50

## 2021-06-04 RX ADMIN — Medication: at 07:53

## 2021-06-04 RX ADMIN — SODIUM CHLORIDE: 9 INJECTION, SOLUTION INTRAVENOUS at 07:52

## 2021-06-04 RX ADMIN — ENOXAPARIN SODIUM 40 MG: 40 INJECTION SUBCUTANEOUS at 07:50

## 2021-06-04 ASSESSMENT — PAIN SCALES - GENERAL
PAINLEVEL_OUTOF10: 0

## 2021-06-04 NOTE — PROGRESS NOTES
Resting in bed awake. No complaints or needs at present time. AM assessment complete. Alert and oriented. C/o dry mouth. Bed alarm in place and turned on. Call light in reach. Patient is able to demonstrate the ability to move from a reclining position to an upright position within the recliner. Bedside Mobility Assessment Tool (BMAT):     Assessment Level 1- Sit and Shake    1. From a semi-reclined position, ask patient to sit up and rotate to a seated position at the side of the bed. Can use the bedrail. 2. Ask patient to reach out and grab your hand and shake making sure patient reaches across his/her midline. Pass- Patient is able to come to a seated position, maintain core strength. Maintains seated balance while reaching across midline. Move on to Assessment Level 2. Assessment Level 2- Stretch and Point   1. With patient in seated position at the side of the bed, have patient place both feet on the floor (or stool) with knees no higher than hips. 2. Ask patient to stretch one leg and straighten the knee, then bend the ankle/flex and point the toes. If appropriate, repeat with the other leg. Pass- Patient is able to demonstrate appropriate quad strength on intended weight bearing limb(s). Move onto Assessment Level 3. Assessment Level 3- Stand   1. Ask patient to elevate off the bed or chair (seated to standing) using an assistive device (cane, bedrail). 2. Patient should be able to raise buttocks off be and hold for a count of five. May repeat once. Pass- Patient maintains standing stability for at least 5 seconds, proceed to assessment level 4. Assessment Level 4- Walk   1. Ask patient to march in place at bedside. 2. Then ask patient to advance step and return each foot. Some medical conditions may render a patient from stepping backwards, use your best clinical judgement.    Pass- Patient demonstrates balance while shifting weight and ability to step, takes independent steps, does not use assistive device patient is MOBILITY LEVEL 4.       Mobility Level- 4

## 2021-06-04 NOTE — PROGRESS NOTES
Returned to room via wheelchair in stable condition. Bed alarm in place and turned on. IV restarted and infusing without difficulty.

## 2021-06-04 NOTE — PLAN OF CARE
Problem: Falls - Risk of:  Goal: Will remain free from falls  Description: Will remain free from falls  6/3/2021 2212 by Sven Burk RN  Outcome: Ongoing  6/3/2021 1047 by Anu Martines RN  Outcome: Ongoing  6/3/2021 0932 by Anu Martines RN  Outcome: Ongoing  Goal: Absence of physical injury  Description: Absence of physical injury  Outcome: Ongoing     Problem: Infection:  Goal: Will remain free from infection  Description: Will remain free from infection  Outcome: Ongoing     Problem: Safety:  Goal: Free from accidental physical injury  Description: Free from accidental physical injury  Outcome: Ongoing  Goal: Free from intentional harm  Description: Free from intentional harm  Outcome: Ongoing     Problem: Daily Care:  Goal: Daily care needs are met  Description: Daily care needs are met  6/3/2021 2212 by Sven Burk RN  Outcome: Ongoing  6/3/2021 1047 by Anu Martines RN  Outcome: Ongoing  6/3/2021 0932 by Anu Martines RN  Outcome: Ongoing     Problem: Pain:  Goal: Patient's pain/discomfort is manageable  Description: Patient's pain/discomfort is manageable  6/3/2021 2212 by Sven Burk RN  Outcome: Ongoing  6/3/2021 0932 by Anu Martines RN  Outcome: Ongoing  Goal: Pain level will decrease  Description: Pain level will decrease  Outcome: Ongoing  Goal: Control of acute pain  Description: Control of acute pain  Outcome: Ongoing     Problem: Skin Integrity:  Goal: Skin integrity will stabilize  Description: Skin integrity will stabilize  Outcome: Ongoing     Problem: Discharge Planning:  Goal: Patients continuum of care needs are met  Description: Patients continuum of care needs are met  6/3/2021 2212 by Sven Burk RN  Outcome: Ongoing  6/3/2021 1047 by Anu Martines RN  Outcome: Ongoing

## 2021-06-04 NOTE — PROGRESS NOTES
Patient is awake, alert, oriented to time place and person. She is mentally competent per my evaluation today. She is able to make her own financial decisions at the time of this evaluation.       Jessica Robles MD 6/4/2021 11:16 AM

## 2021-06-04 NOTE — PROGRESS NOTES
Pt wanted this writer to call her son and inform him that \"there is no blockage\". This writer called Silvia Cates (son) and informed him of what she said. Silvia Cates is on way to see pt. Informed pt.

## 2021-06-04 NOTE — FLOWSHEET NOTE
06/04/21 0731   Handoff   Communication Given Shift Handoff   Oncoming Nurse/Offgoing Nurse [facility]   Handoff Communication Face to Face; At bedside   Time Handoff Given 1708   End of Shift Check Performed Yes

## 2021-06-04 NOTE — FLOWSHEET NOTE
Pt A/Ox4. VSS. Pain 3/10, RUQ, declines pain medication at this time. Pt unlabored, respirations even & easy. No distress noted. Shift assessment complete. See flowsheet. Blood glucose 68, IV dextrose given. Blood glucose recheck 132. PM meds given. See MAR. Denies needs at this time. Bed alarm on. Bed in low position. Call light within reach. 06/03/21 2017   Vital Signs   Temp 97.3 °F (36.3 °C)   Temp Source Oral   Pulse 75   Heart Rate Source Monitor   Resp 16   /68   BP Location Left upper arm   Patient Position Semi fowlers   Level of Consciousness Alert (0)   MEWS Score 1   Patient Currently in Pain Yes   Pain Assessment   Pain Assessment 0-10   Pain Level 3   Pain Type Acute pain   Pain Location Abdomen   Pain Orientation Right;Upper  (RUQ)   Pain Onset Gradual   Clinical Progression Gradually worsening   Non-Pharmaceutical Pain Intervention(s) Repositioned; Rest   Oxygen Therapy   SpO2 95 %   O2 Device None (Room air)

## 2021-06-04 NOTE — CARE COORDINATION
DISCHARGE ORDER  Date/Time 2021 12:04 PM  Completed by: Marcia Turner RN, Case Management    Patient Name: Sandi Olson      : 1935  Admitting Diagnosis: Jaundice [R17]      Admit order Date and Status: IP 2021  (verify MD's last order for status of admission)      Noted discharge order. If applicable PT/OT recommendation at Discharge: NA  DME recommendation by PT/OT: Na  Confirmed discharge plan with patient and son, Pradeep Coreas @ bedside  Discharge Plan: Pt will DC home today in care of family Son, Pradeep Coreas at bedside states that he is working on getting a hospital bed delivered to pt's home today. Once the bed is delivered he will return to pick pt up in his [facility]. Family had inquired about EMS transport but decided against it due to high cost and will likely not be covered by insurance. Pt states that she prefers to go by private car. Son states he will need help getting her into the car but that he will have plenty of family to help and a transport chair to get her into the house. Per Kaleigh Lopez RN pt has been able to pivot into WC and can tolerate sitting upright. She will need assistance in and out of the car. Pt amd family aware and have family to assist once she is home. Reviewed chart. Role of discharge planner explained and patient verbalized understanding. Discharge order is noted. Has Home O2 in place on admit:  No  Informed of need to bring portable home O2 tank on day of discharge for nursing to connect prior to leaving:   No  Verbalized agreement/Understanding:   No  Pt is being d/c'd to home today. Pt's O2 sats are 96% on RA. Discharge timeout done with Kaleihg Lopez RN. All discharge needs and concerns addressed. no indicators present

## 2021-06-04 NOTE — DISCHARGE SUMMARY
Name:  Jacqueline Chen  Room:  0212/0212-01  MRN:    [id number]    Discharge Summary      This discharge summary is in conjunction with a complete physical exam done on the day of discharge. Discharging Physician: Dr. Canela Ebbs: 6/2/2021  Discharge: 6/4/2021     HPI taken from admission H&P:    81 y.o. female recently admitted with new dx colon CA with liver metastasis, s/p R hemicolectmy. She was seen for outpatient follow up today by Surgery, was noted to be icteric and advised ER consultation. Since discharge patient denies difficulty swallowing, but has had continued poor appetite, PO intake with complaint of fatigue and weakness. She does recall onset of yellow skin discoloration 2-3 days ago, but denies fever, abdominal pain, vomiting, diarrhea. Diagnoses this Admission and Hospital Course   Jaundice  Transaminitis   Coagulopathy   - direct admit from surgeons office at hospital follow up appointment for new onset jaundice   -  liver mets from colon cancer (bili on day of d/c 1.1 ,now with jaundice and bili up to 13.9)  - GI consult: discission about possible palliative stent placement-->no evidence of extrinsic compression or intra-ductal dilation   - RUQ US shows multiple nodules without evidence of CBD dilation   - Hem/onc following: palliative chemo not an option given liver abnormalities   - Discussed extensively with the patient and her son about end of life. Patient had a bad experience with hospice with her  and is very resistant to their services. We discussed their role is controlling pain/symptoms to prevent future hospital visits/stays.  Both the patient and her son would like to wait and take the patient home first and possibly meet with hospice in the future.      Hypokalemia  - K: 3.1  - PO replacement       Hyponatremia  - Poor PO intake   - IVF   - Na; 130 > 129 > 133     UTI  - UA with + LE,  WBC   - Urine Cx: negative   - Continue Rocephin D#2, can stop Abx     Normocytic Anemia  - baseline hgb ~11   - B12 elevated, folate WNL   - Iron studies: WNL     Stage IV Colon Cancer with liver mets  - Recently dx with SBO and cecal mass s/p R hemicolectomy and liver bx on 5/17  - hem/onc following-->has not seen patient on OP basis yet   - Per oncology: \" Due to age and burden of disease, likely to discuss hospice/palliative care. Discussed with patient today the stage of her cancer, the non curative nature of her disease, and the difficulty in giving her palliative chemo given her liver dysfunction. She would like to \"think and pray\" over her condition. \"  - See above, does not wish for IP hospice.      Insomnia   - Continue melatonin      Dry Mouth  - Biotene   - Continue IV hydration as well     Procedures (Please Review Full Report for Details)  None     Consults    GI   Hem/onc    Physical Exam at Discharge:    /76   Pulse 69   Temp 96.9 °F (36.1 °C) (Oral)   Resp 16   Ht 5' (1.524 m)   Wt 116 lb 2 oz (52.7 kg)   SpO2 96%   BMI 22.68 kg/m²     Gen: Elderly female, Alert. Eyes: PERRL. + sclera icterus. No conjunctival injection. ENT: No discharge. Pharynx clear. Neck:   Trachea midline. Resp: No accessory muscle use. No crackles. No wheezes. No rhonchi. CV: Regular rate. Regular rhythm. No murmur. No rub. No edema. Capillary Refill: Brisk,< 3 seconds   Peripheral Pulses: +2 palpable, equal bilaterally   GI: mild tenderness diffusely. Non-distended. Normal bowel sounds. Skin: Jaundice, midline abdominal incision with c/d/i dressings  M/S: No cyanosis. No joint deformity. No clubbing. Neuro: Awake. Grossly nonfocal    Psych: Oriented x 3. No anxiety or agitation.     CBC:   Recent Labs     06/02/21  1640 06/03/21 0522   WBC 11.0 10.2   HGB 11.6* 10.7*   HCT 35.0* 32.1*   MCV 91.1 90.5    312     BMP:   Recent Labs     06/02/21  1640 06/03/21 0522 06/04/21  0534   * 129* 133*   K 3.1* 3.5 3.1*   CL 90* 91* 96*   CO2 31 30 26 BUN 13 13 10   CREATININE <0.5* <0.5* <0.5*     LIVER PROFILE:   Recent Labs     06/02/21  1640 06/03/21  0522 06/04/21  0534   * 309* 324*   * 145* 155*   BILIDIR >10.0* >10.0*  --    BILITOT 13.9* 13.0* 14.7*   ALKPHOS 1,807* 1,677* 1,834*     PT/INR:   Recent Labs     06/02/21  1640   PROTIME 23.0*   INR 1.97*     UA:  Recent Labs     06/03/21  0635   COLORU BROWN*   PHUR 7.0   WBCUA *   RBCUA 0-2   MUCUS 1+*   BACTERIA 2+*   CLARITYU SL CLOUDY*   SPECGRAV 1.015   LEUKOCYTESUR LARGE*   UROBILINOGEN 1.0   BILIRUBINUR LARGE*   BLOODU TRACE-INTACT*   GLUCOSEU Negative     CULTURES  Urine Cx: Negative     RADIOLOGY  MRI ABDOMEN WO CONTRAST MRCP   Final Result   1. Technically limited MRCP showing no evidence of biliary dilatation. 2. Diffuse hepatic metastasis not well characterized on this noncontrast   study. XR ABDOMEN (KUB) (SINGLE AP VIEW)   Final Result   No air-filled dilated loops of bowel to suggest ileus or obstruction. US GALLBLADDER RUQ   Final Result   1. Multiple scattered nonspecific hypoechoic nodules throughout the liver   parenchyma, concerning for metastatic disease. See prior abdominal CT   studies for more details. 2. Contracted gallbladder, limiting its evaluation. 3. Unremarkable sonographic appearance of the common bile duct, without   evidence of ductal dilatation. 4. Unremarkable sonographic appearance of the right kidney and pancreas. 5. Small right pleural effusion. Discharge Medications     Medication List      ASK your doctor about these medications    oxyCODONE-acetaminophen 5-325 MG per tablet  Commonly known as: Endocet  Take 1 tablet by mouth every 6 hours as needed for Pain for up to 5 days. Intended supply: 5 days. Take lowest dose possible to manage pain  Ask about: Should I take this medication? Discharged in stable condition to home. She has a very limited prognosis. Follow Up:   Follow up with PCP      More than 30 minutes spent. Patient has a very poor prognosis.       Iglesia Manzano MD 6/4/2021 11:15 AM

## 2021-06-04 NOTE — PROGRESS NOTES
ONCOLOGY FOLLOW-UP:         PROBLEM LIST:       Patient Active Problem List   Diagnosis Code    Small bowel obstruction ([facility] [address] K56.609    Ileus (HCC) K56.7    Acute cystitis without hematuria N30.00    Elevated liver enzymes R74.8    Colonic mass K63.89    Liver nodule K76.89    Jaundice R17    Colon cancer metastasized to liver (HCC) C18.9, C78.7    Transaminitis R74.01    Hyponatremia E87.1    Hypokalemia E87.6       INTERVAL HISTORY:       Pt remains admitted. Bilirubin 14.7. Seen by GI and awaits MRCP. REVIEW OF SYSTEMS:       10 point ROS completed. Pertinent positives in HPI, otherwise negative.      PHYSICAL EXAM:       /76   Pulse 69   Temp 96.9 °F (36.1 °C) (Oral)   Resp 16   Ht 5' (1.524 m)   Wt 116 lb 2 oz (52.7 kg)   SpO2 96%   BMI 22.68 kg/m²     General appearance: alert and cooperative  Head: + scleral icterus  Neck: No palpable lymphadenopathy in supraclavicular or cervical chains  Lungs: Clear to auscultation bilaterally, no audible rales, wheezes or crackles  Heart: Regular rate and rhythm, S1, S2 normal  Abdomen: Soft, non-tender; bowel sounds normal; no masses,  no organomegaly  Extremities: without cyanosis, clubbing, edema or asymmetry  Skin: jaundice    LABS:     CBC:   Lab Results   Component Value Date    WBC 10.2 06/03/2021    HGB 10.7 (L) 06/03/2021    HCT 32.1 (L) 06/03/2021    MCV 90.5 06/03/2021     06/03/2021    LYMPHOPCT 12.0 06/03/2021    RBC 3.54 (L) 06/03/2021    MCH 30.2 06/03/2021    MCHC 33.4 06/03/2021    RDW 15.1 06/03/2021       BMP:   Lab Results   Component Value Date     06/04/2021    K 3.1 06/04/2021    CL 96 06/04/2021    CO2 26 06/04/2021    BUN 10 06/04/2021    CREATININE <0.5 06/04/2021    GLUCOSE 87 06/04/2021    CALCIUM 8.0 06/04/2021        Hepatic Function Panel:   Lab Results   Component Value Date    ALKPHOS 1,834 06/04/2021     06/04/2021     06/04/2021    PROT 5.8 06/04/2021    BILITOT 14.7 06/04/2021 BILIDIR >10.0 06/03/2021    LABALBU 2.0 06/04/2021        TUMOR MARKERS:   Lab Results   Component Value Date    CEA 1546.0 06/03/2021     Lab Results   Component Value Date    IRON 40 06/03/2021    TIBC 135 (L) 06/03/2021    FERRITIN 1,636.0 (H) 06/03/2021     Lab Results   Component Value Date    EVLLMUOW86 0225 (H) 06/03/2021     Lab Results   Component Value Date    FOLATE 9.74 06/03/2021       IMAGING:     XR ABDOMEN (KUB) (SINGLE AP VIEW)  Result Date: 6/3/2021  No air-filled dilated loops of bowel to suggest ileus or obstruction. CT ABDOMEN PELVIS W IV CONTRAST Additional Contrast? None  Result Date: 5/17/2021  Partial small bowel obstruction versus ileus. There is moderate dilation of the small bowel with transition at the terminal ileum. The degree of small bowel dilation is mildly increased since 05/09/2021. Neoplasm versus inflammation of the cecum/terminal ileum. Mural thickening, especially the ascending colon, is persistent. Stable multiple hepatic nodules and portacaval/gastrohepatic adenopathy. Small amount of ascites, perihepatic and perienteric, perhaps slightly increased. Stable triangular-shaped 1.5 cm nodule in the left lung base, inflammation/atelectasis versus metastasis. CT ABDOMEN PELVIS W IV CONTRAST Additional Contrast? None  Result Date: 5/10/2021  1. Circumferential heterogeneous thickening of the ascending colon and circumferential thickening of the terminal ileum surrounding edema/inflammation. Differential includes colonic mass. Recommend colonoscopy for further evaluation. 2. Numerous hepatic nodules ranging in size from less than 1 cm to 2.8 cm. Recommend MRI of the liver for further evaluation. Differential includes liver metastasis. 3. Gastrohepatic, estuardo hepatic, and peripancreatic adenopathy with multiple nodes of low density suggestive of metastasis with central necrosis.  4. Although not well seen on the CT of the abdomen, there is a stellate nodular density in the left lower lobe, partially seen. Recommend CT of the chest for further evaluation. US GALLBLADDER RUQ  Result Date: 6/3/2021  1. Multiple scattered nonspecific hypoechoic nodules throughout the liver parenchyma, concerning for metastatic disease. See prior abdominal CT studies for more details. 2. Contracted gallbladder, limiting its evaluation. 3. Unremarkable sonographic appearance of the common bile duct, without evidence of ductal dilatation. 4. Unremarkable sonographic appearance of the right kidney and pancreas. 5. Small right pleural effusion. XR CHEST PORTABLE  Result Date: 5/17/2021  No radiographic evidence of acute pulmonary disease. XR CHEST PORTABLE  Result Date: 5/9/2021  1. No acute radiographic abnormality in the chest.     CT CHEST PULMONARY EMBOLISM W CONTRAST  Result Date: 5/9/2021  1. Artifact degraded evaluation of the pulmonary arteries. No acute pulmonary embolism to the proximal segmental arteries. 2. Opacities in the segmental and subsegmental left lower lobe airways. Aspiration and endobronchial lesion are in the differential.  Follow-up recommended. 3. Nodular airspace disease in the right upper lobe and additional nodular opacities measuring up to 0.4 cm. Correlate with presentation for early pneumonia. Three-month follow-up recommended. 4. Nonspecific mediastinal and hilar adenopathy. Follow-up recommended. 5. Other findings as described.        ASSESSMENT AND PLAN:       Stage 4 Colon CA with diffuse liver mets; [id number]   - Newly diagnosed s/p R colectomy for cecal mass on 5/18   - has not been seen in the office yet   - path shows NO signs of MMR def or MSI high   - Cannot give chemo in this setting of Bili 14 due to toxicity   - Cont supportive care  - GI checking MRCP to see if obstructive lesion amenable to stent  - concerned this is due instead to diffuse hepatocellular disease  - If bilirubin not improved and not amenable to stenting, there is not a role for chemotherapy and would consider Hospice.      Anemia  - check iron, b12 and folate   - Hgb is 10.7; transfuse to keep Hgb over 7     Hx of post op ileus  - KUB shows no obstruction    Insomnia  - PRN Ativan   - Melatonin     Coagulopathy  - Vitamin K 5 mg PO X 1 ordered for INR 1.9    Bernie Rizzo MD

## 2021-06-04 NOTE — PROGRESS NOTES
D/c instructions given to pt with prescriptions sent to home pharmacy. Explanation of new medications and instructions reviewed with son. Verbalized understanding of instructions. D/c per wheelchair to private car in stable condition.

## 2021-06-04 NOTE — PROGRESS NOTES
PROGRESS NOTE  S:85 yrs Patient  admitted on 6/2/2021 with Jaundice [R17] . Today she complains of dry mouth. Exam:   Vitals:    06/04/21 0750   BP:    Pulse: 69   Resp:    Temp:    SpO2:       General appearance: alert, appears stated age, cooperative, icteric, no distress and syndromic appearance - chronically ill appearing  HEENT: Sclera icteric, Neck supple with midline trachea  Neck: no adenopathy and supple, symmetrical, trachea midline  Lungs: clear to auscultation bilaterally  Heart: regular rate and rhythm, S1, S2 normal, no murmur, click, rub or gallop  Abdomen: normal findings: no masses palpable, soft, non-tender and symmetric and abnormal findings:  hypoactive bowel sounds  Extremities: extremities normal, atraumatic, no cyanosis or edema     Medications: Reviewed    Labs:  CBC:   Recent Labs     06/02/21 1640 06/03/21  0522   WBC 11.0 10.2   HGB 11.6* 10.7*   HCT 35.0* 32.1*   MCV 91.1 90.5    312     BMP:   Recent Labs     06/02/21 1640 06/03/21  0522 06/04/21  0534   * 129* 133*   K 3.1* 3.5 3.1*   CL 90* 91* 96*   CO2 31 30 26   BUN 13 13 10   CREATININE <0.5* <0.5* <0.5*     LIVER PROFILE:   Recent Labs     06/02/21 1640 06/03/21 0522 06/04/21  0534   * 309* 324*   * 145* 155*   PROT 6.2* 5.8* 5.8*   BILIDIR >10.0* >10.0*  --    BILITOT 13.9* 13.0* 14.7*   ALKPHOS 1,807* 1,677* 1,834*     PT/INR:   Recent Labs     06/02/21  1640   INR 1.97*       IMAGING:  MRI ABDOMEN WO CONTRAST MRCP - 6/4/2021  Impression   1. Technically limited MRCP showing no evidence of biliary dilatation. 2. Diffuse hepatic metastasis not well characterized on this noncontrast   study. Attending Supervising [de-identified] Attestation Statement  The patient is a 81 y.o. female. I have performed a history and physical examination of the patient.  I discussed the case with my physician assistant Ino Knox PA-C    I reviewed the patient's Past

## 2021-06-07 ENCOUNTER — CARE COORDINATION (OUTPATIENT)
Dept: CASE MANAGEMENT | Age: 86
End: 2021-06-07

## 2021-06-07 DIAGNOSIS — C78.7 COLON CANCER METASTASIZED TO LIVER (HCC): Primary | ICD-10-CM

## 2021-06-07 DIAGNOSIS — C18.9 COLON CANCER METASTASIZED TO LIVER (HCC): Primary | ICD-10-CM

## 2021-06-07 PROCEDURE — 1111F DSCHRG MED/CURRENT MED MERGE: CPT | Performed by: INTERNAL MEDICINE

## 2021-06-07 NOTE — CARE COORDINATION
[facility] Transitions Initial Follow Up Call    Call within 2 business days of discharge: Yes    Patient: Audra Wadsworth Patient : 1935   MRN: [id number]  Reason for Admission: jaundice, transaminitis, coagulopathy, hypokalemia, hyponatremia, UTI, normocytic anemia, stage IV colon cancer with liver mets, insomnia, dry mouth. Discharge Date: 21 RARS: Readmission Risk Score: 23      Last Discharge [address] [address]       Complaint Diagnosis Description Type Department Provider    21   Admission (Discharged) [facility] 2 Waldemar Suazo MD           Spoke with: Audra Wadsworth (patient) and son April Marshall: Evon Brooks    Non-face-to-face services provided:  Obtained and reviewed discharge summary and/or continuity of care documents    Was this an external facility discharge? No Discharge Facility: NA    Challenges to be reviewed by the provider   Additional needs identified to be addressed with provider No         Method of communication with provider : none    Advance Care Planning:   Does patient have an Advance Directive: decision maker updated. Was this a readmission? Yes  Patient stated reason for admission: sent by surgeon to ER  Patients top risk factors for readmission: medical condition-  and PCP relationship    Care Transition Nurse (CTN) contacted the patient by telephone to perform post hospital discharge assessment. Provided introduction to self, and explanation of the CTN role. CTN reviewed discharge instructions, medical action plan and red flags with family who verbalized understanding. Family given an opportunity to ask questions and does not have any further questions or concerns at this time. Were discharge instructions available to patient? Yes. Reviewed appropriate site of care based on symptoms and resources available to patient including: PCP. The patient agrees to contact the PCP office for questions related to their healthcare.      Medication reconciliation was performed with family, who verbalizes understanding of administration of home medications. COVID Risk Education    COVID-19 testing not done. Son states she completed Moderna 2-step vaccination at the [facility] 2/26/2021. Family was given an opportunity to verbalize any questions and concerns and agrees to contact CTN or health care provider for questions related to their healthcare. Was patient discharged with a pulse oximeter? No     Appetite still poor. Son also reports weakness. He is with her 24/7 to provide assistance, meals, etc. Declines referrals to home care or hospice at this time. Reviewed upcoming new patient appt at care [facility] on Friday with Dr Toma Corado. He repeats date/time and aware of location. Denies needs at this time. CTN provided contact information for future needs. Plan for follow up call in 7-14 days based on severity of symptoms and risk factors.       Care Transitions 24 Hour Call    Schedule Follow Up Appointment with PCP: Completed  Do you have any ongoing symptoms?: Yes  Patient-reported symptoms: Weakness  Interventions for patient-reported symptoms: Other  Do you have a copy of your discharge instructions?: Yes  Do you have all of your prescriptions and are they filled?: Yes  Have you been contacted by a [address]?: No  Have you scheduled your follow up appointment?: Yes  How are you going to get to your appointment?: Car - family or friend to transport  Were you discharged with any Home Care or Post Acute Services: No  Do you feel like you have everything you need to keep you well at home?: Yes  Care Transitions Interventions  No Identified Needs     Other Therapy Services: Declined      Other Services: Declined          Follow Up  Future Appointments   Date Time Provider Deneen Arreguin   6/11/2021 10:15 AM MD THOM Guillen RN

## 2021-06-08 LAB — CA 19-9: 1639 U/ML (ref 0–35)

## 2021-06-11 ENCOUNTER — OFFICE VISIT (OUTPATIENT)
Dept: INTERNAL MEDICINE CLINIC | Age: 86
End: 2021-06-11

## 2021-06-11 ENCOUNTER — HOSPITAL ENCOUNTER (INPATIENT)
Age: 86
LOS: 1 days | Discharge: HOSPICE | DRG: 436 | End: 2021-06-12
Attending: EMERGENCY MEDICINE | Admitting: INTERNAL MEDICINE
Payer: MEDICARE

## 2021-06-11 VITALS — HEIGHT: 60 IN | WEIGHT: 115 LBS | BODY MASS INDEX: 22.58 KG/M2 | OXYGEN SATURATION: 96 % | HEART RATE: 104 BPM

## 2021-06-11 DIAGNOSIS — R10.84 INTRACTABLE GENERALIZED ABDOMINAL PAIN: Primary | ICD-10-CM

## 2021-06-11 DIAGNOSIS — C18.9 METASTATIC COLON CANCER TO LIVER (HCC): Primary | ICD-10-CM

## 2021-06-11 DIAGNOSIS — R63.0 DECREASED APPETITE: ICD-10-CM

## 2021-06-11 DIAGNOSIS — C78.7 METASTATIC COLON CANCER TO LIVER (HCC): Primary | ICD-10-CM

## 2021-06-11 DIAGNOSIS — R53.81 DEBILITY: ICD-10-CM

## 2021-06-11 DIAGNOSIS — R10.84 GENERALIZED ABDOMINAL PAIN: ICD-10-CM

## 2021-06-11 DIAGNOSIS — E86.0 DEHYDRATION: ICD-10-CM

## 2021-06-11 DIAGNOSIS — C78.7 METASTATIC COLON CANCER TO LIVER (HCC): ICD-10-CM

## 2021-06-11 DIAGNOSIS — C18.9 METASTATIC COLON CANCER TO LIVER (HCC): ICD-10-CM

## 2021-06-11 DIAGNOSIS — R17 JAUNDICE: ICD-10-CM

## 2021-06-11 PROBLEM — R10.9 ABDOMINAL PAIN: Status: ACTIVE | Noted: 2021-06-11

## 2021-06-11 LAB
A/G RATIO: 0.6 (ref 1.1–2.2)
ALBUMIN SERPL-MCNC: 2.2 G/DL (ref 3.4–5)
ALP BLD-CCNC: 1812 U/L (ref 40–129)
ALT SERPL-CCNC: 200 U/L (ref 10–40)
ANION GAP SERPL CALCULATED.3IONS-SCNC: 14 MMOL/L (ref 3–16)
ANISOCYTOSIS: ABNORMAL
AST SERPL-CCNC: 397 U/L (ref 15–37)
BANDED NEUTROPHILS RELATIVE PERCENT: 2 % (ref 0–7)
BASOPHILS ABSOLUTE: 0 K/UL (ref 0–0.2)
BASOPHILS RELATIVE PERCENT: 0 %
BILIRUB SERPL-MCNC: 17.8 MG/DL (ref 0–1)
BUN BLDV-MCNC: 24 MG/DL (ref 7–20)
CALCIUM SERPL-MCNC: 8.5 MG/DL (ref 8.3–10.6)
CHLORIDE BLD-SCNC: 98 MMOL/L (ref 99–110)
CO2: 27 MMOL/L (ref 21–32)
CREAT SERPL-MCNC: <0.5 MG/DL (ref 0.6–1.2)
EOSINOPHILS ABSOLUTE: 0 K/UL (ref 0–0.6)
EOSINOPHILS RELATIVE PERCENT: 0 %
GFR AFRICAN AMERICAN: >60
GFR NON-AFRICAN AMERICAN: >60
GLOBULIN: 3.5 G/DL
GLUCOSE BLD-MCNC: 110 MG/DL (ref 70–99)
HCT VFR BLD CALC: 34.6 % (ref 36–48)
HEMOGLOBIN: 11.3 G/DL (ref 12–16)
LIPASE: 22 U/L (ref 13–60)
LYMPHOCYTES ABSOLUTE: 0.9 K/UL (ref 1–5.1)
LYMPHOCYTES RELATIVE PERCENT: 7 %
MAGNESIUM: 2.3 MG/DL (ref 1.8–2.4)
MCH RBC QN AUTO: 30.6 PG (ref 26–34)
MCHC RBC AUTO-ENTMCNC: 32.8 G/DL (ref 31–36)
MCV RBC AUTO: 93.4 FL (ref 80–100)
MONOCYTES ABSOLUTE: 0.4 K/UL (ref 0–1.3)
MONOCYTES RELATIVE PERCENT: 3 %
NEUTROPHILS ABSOLUTE: 11.8 K/UL (ref 1.7–7.7)
NEUTROPHILS RELATIVE PERCENT: 88 %
PDW BLD-RTO: 17.7 % (ref 12.4–15.4)
PLATELET # BLD: 295 K/UL (ref 135–450)
PLATELET SLIDE REVIEW: ADEQUATE
PMV BLD AUTO: 8.8 FL (ref 5–10.5)
POTASSIUM REFLEX MAGNESIUM: 3.4 MMOL/L (ref 3.5–5.1)
RBC # BLD: 3.7 M/UL (ref 4–5.2)
SARS-COV-2, NAAT: NOT DETECTED
SLIDE REVIEW: ABNORMAL
SODIUM BLD-SCNC: 139 MMOL/L (ref 136–145)
TOTAL PROTEIN: 5.7 G/DL (ref 6.4–8.2)
WBC # BLD: 13.1 K/UL (ref 4–11)

## 2021-06-11 PROCEDURE — 85025 COMPLETE CBC W/AUTO DIFF WBC: CPT

## 2021-06-11 PROCEDURE — G0378 HOSPITAL OBSERVATION PER HR: HCPCS

## 2021-06-11 PROCEDURE — 96374 THER/PROPH/DIAG INJ IV PUSH: CPT

## 2021-06-11 PROCEDURE — 87635 SARS-COV-2 COVID-19 AMP PRB: CPT

## 2021-06-11 PROCEDURE — 2580000003 HC RX 258: Performed by: INTERNAL MEDICINE

## 2021-06-11 PROCEDURE — 6360000002 HC RX W HCPCS: Performed by: EMERGENCY MEDICINE

## 2021-06-11 PROCEDURE — 83735 ASSAY OF MAGNESIUM: CPT

## 2021-06-11 PROCEDURE — 83690 ASSAY OF LIPASE: CPT

## 2021-06-11 PROCEDURE — 1200000000 HC SEMI PRIVATE

## 2021-06-11 PROCEDURE — 99204 OFFICE O/P NEW MOD 45 MIN: CPT | Performed by: INTERNAL MEDICINE

## 2021-06-11 PROCEDURE — 99283 EMERGENCY DEPT VISIT LOW MDM: CPT

## 2021-06-11 PROCEDURE — 80053 COMPREHEN METABOLIC PANEL: CPT

## 2021-06-11 PROCEDURE — 96375 TX/PRO/DX INJ NEW DRUG ADDON: CPT

## 2021-06-11 RX ORDER — ACETAMINOPHEN 650 MG/1
650 SUPPOSITORY RECTAL EVERY 6 HOURS PRN
Status: DISCONTINUED | OUTPATIENT
Start: 2021-06-11 | End: 2021-06-12 | Stop reason: HOSPADM

## 2021-06-11 RX ORDER — SODIUM CHLORIDE 0.9 % (FLUSH) 0.9 %
5-40 SYRINGE (ML) INJECTION EVERY 12 HOURS SCHEDULED
Status: DISCONTINUED | OUTPATIENT
Start: 2021-06-11 | End: 2021-06-12 | Stop reason: HOSPADM

## 2021-06-11 RX ORDER — MORPHINE SULFATE 4 MG/ML
4 INJECTION, SOLUTION INTRAMUSCULAR; INTRAVENOUS
Status: DISCONTINUED | OUTPATIENT
Start: 2021-06-11 | End: 2021-06-12 | Stop reason: HOSPADM

## 2021-06-11 RX ORDER — ONDANSETRON 2 MG/ML
4 INJECTION INTRAMUSCULAR; INTRAVENOUS EVERY 6 HOURS PRN
Status: DISCONTINUED | OUTPATIENT
Start: 2021-06-11 | End: 2021-06-12 | Stop reason: HOSPADM

## 2021-06-11 RX ORDER — POLYETHYLENE GLYCOL 3350 17 G/17G
17 POWDER, FOR SOLUTION ORAL DAILY PRN
Status: DISCONTINUED | OUTPATIENT
Start: 2021-06-11 | End: 2021-06-12 | Stop reason: HOSPADM

## 2021-06-11 RX ORDER — SODIUM CHLORIDE 9 MG/ML
25 INJECTION, SOLUTION INTRAVENOUS PRN
Status: DISCONTINUED | OUTPATIENT
Start: 2021-06-11 | End: 2021-06-12 | Stop reason: HOSPADM

## 2021-06-11 RX ORDER — ONDANSETRON 4 MG/1
4 TABLET, ORALLY DISINTEGRATING ORAL EVERY 8 HOURS PRN
Status: DISCONTINUED | OUTPATIENT
Start: 2021-06-11 | End: 2021-06-12 | Stop reason: HOSPADM

## 2021-06-11 RX ORDER — ACETAMINOPHEN 325 MG/1
650 TABLET ORAL EVERY 6 HOURS PRN
Status: DISCONTINUED | OUTPATIENT
Start: 2021-06-11 | End: 2021-06-12 | Stop reason: HOSPADM

## 2021-06-11 RX ORDER — MORPHINE SULFATE 4 MG/ML
4 INJECTION, SOLUTION INTRAMUSCULAR; INTRAVENOUS EVERY 30 MIN PRN
Status: DISCONTINUED | OUTPATIENT
Start: 2021-06-11 | End: 2021-06-11

## 2021-06-11 RX ORDER — SODIUM CHLORIDE 0.9 % (FLUSH) 0.9 %
5-40 SYRINGE (ML) INJECTION PRN
Status: DISCONTINUED | OUTPATIENT
Start: 2021-06-11 | End: 2021-06-12 | Stop reason: HOSPADM

## 2021-06-11 RX ORDER — ONDANSETRON 2 MG/ML
4 INJECTION INTRAMUSCULAR; INTRAVENOUS EVERY 30 MIN PRN
Status: DISCONTINUED | OUTPATIENT
Start: 2021-06-11 | End: 2021-06-11

## 2021-06-11 RX ADMIN — ONDANSETRON HYDROCHLORIDE 4 MG: 2 INJECTION, SOLUTION INTRAMUSCULAR; INTRAVENOUS at 16:04

## 2021-06-11 RX ADMIN — MORPHINE SULFATE 4 MG: 4 INJECTION, SOLUTION INTRAMUSCULAR; INTRAVENOUS at 16:02

## 2021-06-11 RX ADMIN — SODIUM CHLORIDE, PRESERVATIVE FREE 10 ML: 5 INJECTION INTRAVENOUS at 23:13

## 2021-06-11 ASSESSMENT — PAIN DESCRIPTION - LOCATION: LOCATION: ABDOMEN

## 2021-06-11 ASSESSMENT — ENCOUNTER SYMPTOMS
CONSTIPATION: 0
ABDOMINAL PAIN: 1
NAUSEA: 1
BACK PAIN: 0
COUGH: 0
DIARRHEA: 0
VOMITING: 0
SHORTNESS OF BREATH: 0
SORE THROAT: 0

## 2021-06-11 ASSESSMENT — PAIN SCALES - GENERAL: PAINLEVEL_OUTOF10: 7

## 2021-06-11 NOTE — ED NOTES
RN offered water to pt but pt unable to drink water at this time. States her tongue hurts. RN updated family on plan for case management to call [facility] and then will call RN with update.       Fabián Mejia RN  06/11/21 6129

## 2021-06-11 NOTE — ED TRIAGE NOTES
Dr. Elias Lopez performed a biopsy of patients colon last month. Son states he was told that the cancer had spread to her liver and there was nothing they could do for her. Patient was sent to follow up with PCP and referred to a place due to not having a pcp of her own. Patient son states it was a free clinic an they do not want to continue to go.  Patient c/o abdominal pain and state \"just let me lay down and die\"

## 2021-06-11 NOTE — ED NOTES
1709 - Perfect serve sent to Dr. Rosi Joel  06/11/21 1710    1718 - Dr. Meaghan Dai called back.       Shanon Childs  06/11/21 1722

## 2021-06-11 NOTE — CARE COORDINATION
Case Management Assessment  Emergency Room       Patient Name: Javier Mcneal  YOB: 1935    Chief Complaint: No admission diagnoses are documented for this encounter. Workup pending: Yes       Case Management Consult: Yes   Social Work Consult: No   Reason for Consult: \"Home hospice\"    Psych Consult: No   Result of Consult: n/a    Chart Reviewed: Yes      Patient Interviewed: Yes   Family Interviewed:  Yes - pt's son Leona Allen at bedside    Hospitalization in the last 30 days:  Yes from 06/02/2021-06/04/2021 with Jaundice.  Pt discharged home on 06/04/2021  ER Visit in the last 30 days: No     Met with: pt and pt's son Caroline Hinton conducted  (bedside/phone): bedside    Current PCP: Dr. Edison Arellano required for SNF : Y          3 night stay required -  N    ADLS  Support Systems/Care Needs:  family  Transportation: family    Meal Preparation: family    Housing  Living Arrangements: pt lives at home with her son Ramona Fuentes  Steps: one  Intent for return to present living arrangements: Yes  Identified Issues: Ronnell Morel  Active with [email or web address] : No Agency:(Services)     Passport/Waiver : No  :                      Phone Number:    Passport/Waiver Services: n/a          Durable Medical Equiptment   DME Provider: n/a  Equipment: Per past CM note  Walker__x_Cane__x_RTS___ BSC___Shower Chair__x_Hospital Bed___W/C____Other________  02 at ____Liter(s)---wears(frequency)_______ [facility] ___ CPAP___ BiPap___   N/A____      Home O2 Use :  No    If No for home O2---if presently on O2 during hospitalization:  No  if yes CM to follow for potential DC O2 need  Informed of need for care provider to bring portable home O2 tank on day of discharge for nursing to connect prior to leaving:   Not Indicated  Verbalized agreement/Understanding:   Not Indicated    Community Service Affiliation  Dialysis:  No    · Agency:  · Location:  · Dialysis Schedule:  · Phone:   · Fax: Other Community Services: n/a    DISCHARGE PLAN: Explained Case Management role/services. Chart review completed. Met with pt and her son at bedside whom pt stated could remain present. Discussed Hospice services with pt and pt's son at bedside. They both are agreeable to hospice services and wants to return home with hospice. Provided pt's son Richi Wray with a hospice list and he requested referral to [address] [address] ([address] [place] [address]). Pt was agreeable to this. Richi Wray stated to have them call his cell phone to schedule a meeting. Richi Wray stated that pt doesn't have a POA so the children would be the next of kin. He inquired if someone could determine if pt could sign a living will, etc. Janes Joy would notify the ED staff. He declined needs or questions for CM at this time. Offered him to speak with [facility] but they declined. Spoke with Pt's JOY Cabrera who was made aware of the above. She stated the per the ED provider, they won't determine if she can sign a living will in the ED. Spoke with Rg, liaison with [address] [place] [address] for the referral. She stated she will send the information to their intake and have someone call writer to update on when pt will be seen. Sera Quintana stated pt will be seen today as she is in the ED. Sera Quintana stated Adriantab Fajardo did not need to send anything to [address] [place] [address]. CM will await return call from [address] [place] [address]. Addendum at 3:15pm: Spoke with Frank Horan at [address] [place] [address] who stated that the referral has been assigned to JOY Giron who is currently at [facility] but the pt will be seen today. Frank Horan is awaiting Phyllis to respond to her on if she has contacted the son to arrange the time today. She stated it will at least be an hour until Mack arrives to the ED. She was given the ED phone numbers to call if CM is gone for the day. Updated pt's JOY Cabrera who is aware of the above and that if pt signs up with [facility], then [address] [place] [address] will arrange for transfer home which can be done after CM leaves for the day. She stated agreement/understanding. Updated pt's son Kenneth Menchaca on the above via phone call and he is in agreement with this. Addendum at 4:20pm: Spoke with Mica at [facility] who stated Sara Bacon is on her way to meet with pt and pt's son at bedside.  Carlos A Portillo RN updated and requested her to update MD once decision has been made from [facility] meeting

## 2021-06-11 NOTE — ED PROVIDER NOTES
[address] ED  EMERGENCY DEPARTMENT ENCOUNTER      Pt Name: Indu Frederick  MRN: [id number]  [place] 1935  Date of evaluation: 6/11/2021  Provider: Bob Swartz MD    CHIEF COMPLAINT       Chief Complaint   Patient presents with    Abdominal Pain     c/o abdominal pain. patient had surgery and has healed surgical incision from recent surgery. Patient is jaundice and has colon cancer that has mets to the liver. HISTORY OF PRESENT ILLNESS   (Location/Symptom, Timing/Onset, Context/Setting, Quality, Duration, Modifying Factors, Severity)  Note limiting factors. Indu Frederick is a 81 y.o. female who presents to the emergency department     Patient is a 30-year-old female recently diagnosed with colon cancer status post hemicolectomy with known mets to the liver. Patient has not been doing well at home and is constantly complaining of pain. She has been unable to eat but has been able to drink. Patient went and saw her primary care provider today but they were unable to help that she needs hospice level of care at this point in time and was referred to the emergency department. Patient had previously declined hospice care due to an experience with her  previously. They are amenable with hospice care at home currently. Patient reports pain diffusely in her abdomen. Reports decreased appetite and nausea but has not vomited. Denies any fevers, chills, chest pain. The history is provided by the patient. Nursing Notes were reviewed. REVIEW OF SYSTEMS    (2-9 systems for level 4, 10 or more for level 5)     Review of Systems   Constitutional: Positive for appetite change and fatigue. Negative for chills and fever. HENT: Negative for congestion and sore throat. Eyes: Negative for visual disturbance. Respiratory: Negative for cough and shortness of breath. Cardiovascular: Negative for chest pain.    Gastrointestinal: Positive for abdominal pain and Transportation (Medical):  Lack of Transportation (Non-Medical):    Physical Activity:     Days of Exercise per Week:     Minutes of Exercise per Session:    Stress:     Feeling of Stress :    Social Connections:     Frequency of Communication with Friends and Family:     Frequency of Social Gatherings with Friends and Family:     Attends Rastafari Services:     Active Member of Clubs or Organizations:     Attends Club or Organization Meetings:     Marital Status:    Intimate Partner Violence:     Fear of Current or Ex-Partner:     Emotionally Abused:     Physically Abused:     Sexually Abused:        SCREENINGS               PHYSICAL EXAM    (up to 7 for level 4, 8 or more for level 5)     ED Triage Vitals   BP Temp Temp src Pulse Resp SpO2 Height Weight   -- -- -- -- -- -- -- --       Physical Exam  Vitals and nursing note reviewed. Constitutional:       Appearance: She is cachectic. She is ill-appearing. HENT:      Head: Normocephalic and atraumatic. Nose: Nose normal. No congestion. Mouth/Throat:      Mouth: Mucous membranes are moist.   Eyes:      Conjunctiva/sclera: Conjunctivae normal.   Cardiovascular:      Rate and Rhythm: Normal rate and regular rhythm. Pulses: Normal pulses. Heart sounds: Normal heart sounds. No murmur heard. Pulmonary:      Effort: Pulmonary effort is normal. No respiratory distress. Breath sounds: Normal breath sounds. Abdominal:      General: There is no distension. Palpations: Abdomen is soft. Tenderness: There is no abdominal tenderness. Comments: Surgical dressing is clean, dry, and intact   Musculoskeletal:         General: No swelling or deformity. Normal range of motion. Cervical back: Normal range of motion and neck supple. Skin:     General: Skin is warm and dry. Coloration: Skin is jaundiced. Neurological:      General: No focal deficit present.       Mental Status: She is alert and oriented to person, place, and time. DIAGNOSTIC RESULTS     EKG: All EKG's are interpreted by the Emergency Department Physician who either signs or Co-signs this chart in the absence of a cardiologist.        RADIOLOGY:     Interpretation per the Radiologist below, if available at the time of this note:    No orders to display         LABS:  Labs Reviewed   CBC WITH AUTO DIFFERENTIAL - Abnormal; Notable for the following components:       Result Value    WBC 13.1 (*)     RBC 3.70 (*)     Hemoglobin 11.3 (*)     Hematocrit 34.6 (*)     RDW 17.7 (*)     Neutrophils Absolute 11.8 (*)     Lymphocytes Absolute 0.9 (*)     Anisocytosis 1+ (*)     All other components within normal limits    Narrative:     Performed at:  [facility],  [place], [facility]   Phone [phone]   COMPREHENSIVE METABOLIC PANEL W/ REFLEX TO MG FOR LOW K - Abnormal; Notable for the following components:    Potassium reflex Magnesium 3.4 (*)     Chloride 98 (*)     Glucose 110 (*)     BUN 24 (*)     CREATININE <0.5 (*)     Total Protein 5.7 (*)     Albumin 2.2 (*)     Albumin/Globulin Ratio 0.6 (*)     Total Bilirubin 17.8 (*)     Alkaline Phosphatase 1,812 (*)      (*)      (*)     All other components within normal limits    Narrative:     Performed at:  [facility],  [place], [facility]   Phone [phone]   LIPASE    Narrative:     Performed at:  [facility],  [place], [facility]   Phone [phone]   MAGNESIUM    Narrative:     Performed at:  [facility]) [facility],  [place], [facility]   Phone [phone]   URINE RT REFLEX TO CULTURE       All other labs were within normal range or not returned as of this dictation.     EMERGENCY DEPARTMENT COURSE and DIFFERENTIAL DIAGNOSIS/MDM:   Vitals:    Vitals:    06/11/21 1435 06/11/21 1437

## 2021-06-11 NOTE — ED PROVIDER NOTES
Emergency Department Encounter  Location: [place] [address] ED  Open Note Time:  5:07 PM    Patient: Jaime Mckeon  MRN: [id number]  : 1935  Date of evaluation: 2021  ED Provider: Mony Beltre MD    Jaime Mckeon was checked out to me by Dr. Angel Chapin. Please see his/her initial documentation for details of the patient's initial ED presentation, physical exam and completed studies. In brief, Jaime Mckeon is a 81 y.o. female who presented to the emergency department for abdominal pain    Current studies pending and/or plan: Home hospice evaluation    I wore goggles, surgical mask, N95 mask and gloves when I evaluated the patient. Physical Exam  Constitutional:       General: She is not in acute distress. Appearance: She is ill-appearing and toxic-appearing. She is not diaphoretic. HENT:      Head: Normocephalic and atraumatic. Pulmonary:      Effort: Pulmonary effort is normal. No respiratory distress. Abdominal:      General: Abdomen is flat. Bowel sounds are decreased. There is no distension. Palpations: There is hepatomegaly and mass. Tenderness: There is generalized abdominal tenderness. Skin:     General: Skin is dry. Coloration: Skin is jaundiced. Neurological:      Mental Status: She is alert.        I have reviewed and interpreted all of the currently available lab results and diagnostics from this visit:      Procedures/interventions/images ordered for this visit  Orders Placed This Encounter   Procedures    CBC Auto Differential    Comprehensive Metabolic Panel w/ Reflex to MG    Lipase    Urinalysis Reflex to Culture    Magnesium    Inpatient consult to Case Management    Inpatient consult to Hospice    Inpatient consult to Hospitalist       Medications ordered for this visit  Orders Placed This Encounter   Medications    morphine sulfate (PF) injection 4 mg    ondansetron (ZOFRAN) injection 4 mg       LABS  Results for orders placed or performed during the hospital encounter of 06/11/21   CBC Auto Differential   Result Value Ref Range    WBC 13.1 (H) 4.0 - 11.0 K/uL    RBC 3.70 (L) 4.00 - 5.20 M/uL    Hemoglobin 11.3 (L) 12.0 - 16.0 g/dL    Hematocrit 34.6 (L) 36.0 - 48.0 %    MCV 93.4 80.0 - 100.0 fL    MCH 30.6 26.0 - 34.0 pg    MCHC 32.8 31.0 - 36.0 g/dL    RDW 17.7 (H) 12.4 - 15.4 %    Platelets 235 849 - 681 K/uL    MPV 8.8 5.0 - 10.5 fL    PLATELET SLIDE REVIEW Adequate     SLIDE REVIEW see below     Neutrophils % 88.0 %    Lymphocytes % 7.0 %    Monocytes % 3.0 %    Eosinophils % 0.0 %    Basophils % 0.0 %    Neutrophils Absolute 11.8 (H) 1.7 - 7.7 K/uL    Lymphocytes Absolute 0.9 (L) 1.0 - 5.1 K/uL    Monocytes Absolute 0.4 0.0 - 1.3 K/uL    Eosinophils Absolute 0.0 0.0 - 0.6 K/uL    Basophils Absolute 0.0 0.0 - 0.2 K/uL    Bands Relative 2 0 - 7 %    Anisocytosis 1+ (A)    Comprehensive Metabolic Panel w/ Reflex to MG   Result Value Ref Range    Sodium 139 136 - 145 mmol/L    Potassium reflex Magnesium 3.4 (L) 3.5 - 5.1 mmol/L    Chloride 98 (L) 99 - 110 mmol/L    CO2 27 21 - 32 mmol/L    Anion Gap 14 3 - 16    Glucose 110 (H) 70 - 99 mg/dL    BUN 24 (H) 7 - 20 mg/dL    CREATININE <0.5 (L) 0.6 - 1.2 mg/dL    GFR Non-African American >60 >60    GFR African American >60 >60    Calcium 8.5 8.3 - 10.6 mg/dL    Total Protein 5.7 (L) 6.4 - 8.2 g/dL    Albumin 2.2 (L) 3.4 - 5.0 g/dL    Albumin/Globulin Ratio 0.6 (L) 1.1 - 2.2    Total Bilirubin 17.8 (H) 0.0 - 1.0 mg/dL    Alkaline Phosphatase 1,812 (H) 40 - 129 U/L     (H) 10 - 40 U/L     (H) 15 - 37 U/L    Globulin 3.5 g/dL   Lipase   Result Value Ref Range    Lipase 22.0 13.0 - 60.0 U/L   Magnesium   Result Value Ref Range    Magnesium 2.30 1.80 - 2.40 mg/dL       IMAGING  No results found. Final ED Course and MDM:    ED course notes for this visit           I spoke with Dr. Reva Montero.  We thoroughly discussed the history, physical exam, laboratory and imaging studies, as well as, emergency department course. Based upon that discussion, we've decided to admit Michelle Toney for further observation and evaluation of Mikaela Woodard's abdominal pain. As I have deemed necessary from their history, physical and studies, I have considered and evaluated Michelle Toney for the following diagnoses:  ACUTE APPENDICITIS, BOWEL OBSTRUCTION, CHOLECYSTITIS, DIVERTICULITIS, INCARCERATED HERNIA, PANCREATITIS, or PERFORATED BOWEL or ULCER. FINAL IMPRESSION  1. Intractable generalized abdominal pain    2. Generalized abdominal pain    3. Decreased appetite        Vitals:  Blood pressure (!) 99/55, pulse 82, resp. rate 18, height 5' (1.524 m), weight 116 lb (52.6 kg), SpO2 94 %. Disposition  Patient understands that they are going to be admitted to the hospital.  There was shared decision making between myself as well as the patient and/or their surrogate and we are in agreement with admission. There is an opportunity for questions and all questions answered to the best of my ability and to the satisfaction of the patient and/or patient's family. Pt is in stable condition upon Admit to med/surg floor. The note was completed using Dragon voice recognition transcription. Every effort was made to ensure accuracy; however, inadvertent transcription errors may be present despite my best efforts to edit errors.     Aishwarya Medina MD  [address] [place], [place] [address]stan Wise MD  06/11/21 [phone]

## 2021-06-11 NOTE — PROGRESS NOTES
Small right pleural effusion. OBJECTIVE:    Pulse 104   Ht 5' (1.524 m)   Wt 115 lb (52.2 kg)   SpO2 96%   BMI 22.46 kg/m²   GENERAL:  Moderate distress, moaning in pain, unable to get comfortable, requires stretcher for transport  HEENT:  Jaundiced, oropharynx dry, no apparent lymphadenopathy,   LUNGS:  Diffuse rhonchi  HEART:  Regular rhythm, no appreciable murmur  ABD:  Diffusely tender, audible bowel sounds  EXT:  2+ edema, neurovascular status intact  NEURO:  No focal neurologic deficits noted. ASSESSMENT / PLAN:   1. Metastatic colon cancer with liver metastases and profound jaundice due to elevated bilirubin. Palliative chemotherapy not an option with liver failure. Now with increasing pain and worsening liver function tests. 2. Hypotension secondary to dehydration:  Will need IV volume expansion. 3. Hypokalemia:  Replace as needed. 4. Abdominal pain related to colon cancer. Transported to emergency department for immediate evaluation and fluids. Admission for pain control and expedited hospice consult. Palliative care and goals of care discussion. Code status is DNR-CC. We will follow peripherally and assist with outpatient care after hospital admission.

## 2021-06-11 NOTE — ED NOTES
RN called report to [facility], who will assume care when pt is transferred upstairs to room 213. RN requested pt transport at [address].       Mayte Banuelos RN  06/11/21 1930

## 2021-06-12 VITALS
DIASTOLIC BLOOD PRESSURE: 67 MMHG | HEIGHT: 60 IN | WEIGHT: 116 LBS | SYSTOLIC BLOOD PRESSURE: 105 MMHG | OXYGEN SATURATION: 95 % | TEMPERATURE: 98.3 F | RESPIRATION RATE: 14 BRPM | HEART RATE: 78 BPM | BODY MASS INDEX: 22.78 KG/M2

## 2021-06-12 PROCEDURE — 6360000002 HC RX W HCPCS: Performed by: INTERNAL MEDICINE

## 2021-06-12 PROCEDURE — G0378 HOSPITAL OBSERVATION PER HR: HCPCS

## 2021-06-12 PROCEDURE — 99222 1ST HOSP IP/OBS MODERATE 55: CPT | Performed by: INTERNAL MEDICINE

## 2021-06-12 PROCEDURE — 2580000003 HC RX 258: Performed by: INTERNAL MEDICINE

## 2021-06-12 RX ORDER — OXYCODONE HYDROCHLORIDE 5 MG/1
5 TABLET ORAL EVERY 6 HOURS PRN
Qty: 16 TABLET | Refills: 0 | Status: SHIPPED | OUTPATIENT
Start: 2021-06-12 | End: 2021-06-16

## 2021-06-12 RX ADMIN — SODIUM CHLORIDE, PRESERVATIVE FREE 10 ML: 5 INJECTION INTRAVENOUS at 07:57

## 2021-06-12 RX ADMIN — ENOXAPARIN SODIUM 40 MG: 40 INJECTION SUBCUTANEOUS at 07:56

## 2021-06-12 NOTE — FLOWSHEET NOTE
06/12/21 0700   Vital Signs   Temp 97.9 °F (36.6 °C)   Temp Source Oral   Pulse 71   Heart Rate Source Monitor   Resp 14   BP (!) 96/59   BP Location Left upper arm   Patient Position Semi fowlers   Level of Consciousness Alert (0)   MEWS Score 1   Oxygen Therapy   SpO2 94 %   O2 Device None (Room air)   AM assessment completed, see flow sheet. Pt is alert and oriented. BP low. Respirations are even & easy. No complaints voiced. Pt denies needs at this time. SR up x 2, and bed in low position. Call light is within reach.

## 2021-06-12 NOTE — CARE COORDINATION
DISCHARGE ORDER  Date/Time 2021 3:18 PM  Completed by: Randal Gleason RN, Case Management    Patient Name: Miguel Angel Yusuf      : 1935  Admitting Diagnosis: Abdominal pain [R10.9]      Admit order Date and Status: 21 inpt  (verify MD's last order for status of admission)      Noted discharge order. Discharge Plan: Noted Rein Livers from [address] here and arranged for dc home with hospice care.   at 16:00

## 2021-06-12 NOTE — H&P
Combined History and Physical and d/c summary      PCP: Christopher Granados MD    Date of Admission: 6/11/2021    Date of Service: Pt seen/examined on 6/12/2021    Chief Complaint:    Chief Complaint   Patient presents with    Abdominal Pain     c/o abdominal pain. patient had surgery and has healed surgical incision from recent surgery. Patient is jaundice and has colon cancer that has mets to the liver. History Of Present Illness: The patient is a 81 y.o. female with metastatic colon cancer who presented to [facility] ED with complaint of abdominal pain. Patient had a recent admission for jaundice and was noted to have significantly elevated liver enzymes. With her colon cancer and mets to the liver, there was concern for possible obstruction/compression causing this. MRCP showed not evidence of obstruction and given her liver enzymes, she was not a candidate for palliative chemotherapy either. Hospice care was discussed with the patient and her son during that admission, but was declined and the patient requested discharge home. Since returning home, the patient has since had increased pain and inability to eat and has decided to enroll in hospice care. She was admitted for pain control and hospice consult. Past Medical History:        Diagnosis Date    Cancer [facility])        Past Surgical History:        Procedure Laterality Date    APPENDECTOMY      COLECTOMY Right 05/18/2021    RIGHT COLECTOMY, LIVER BIOPSY     COLECTOMY N/A 5/18/2021    RIGHT COLECTOMY, LIVER BIOPSY performed by Tiara Gusman MD at [facility]         Medications Prior to Admission:    Prior to Admission medications    Medication Sig Start Date End Date Taking?  Authorizing Provider   melatonin 3 MG TABS tablet Take 1 tablet by mouth nightly 6/4/21 7/4/21  LESLIE Vasquez   Artificial Saliva (BIOTENE DRY MOUTH MOISTURIZING) SOLN liquid Take 15 mLs by mouth 4 times daily 6/4/21   LESLIE Vasquez

## 2021-06-12 NOTE — DISCHARGE INSTR - COC
Continuity of Care Form    Patient Name: Gauri Talbert   :  1935  MRN:  [id number]    Admit date:  2021  Discharge date:  21    Code Status Order: [facility]   Advance Directives:   [facility] Documentation     Date/Time Healthcare Directive Type of Healthcare Directive Copy in [address] Agent's Name Healthcare Agent's Phone Number    21 2128  No, patient does not have an advance directive for healthcare treatment -- -- -- -- --          Admitting Physician:  Ernesto Strong MD  PCP: Lauri Fine MD    Discharging Nurse: [address] Unit/Room#: 0213/0213-02  Discharging Unit Phone Number: [phone]    Emergency Contact:   Extended Emergency Contact Information  Primary Emergency Contact: [address] [facility]  Mobile Phone: [phone]  Relation: Child  Secondary Emergency Contact: [address]  Mobile Phone: [phone]  Relation: Child    Past Surgical History:  Past Surgical History:   Procedure Laterality Date    APPENDECTOMY      COLECTOMY Right 2021    RIGHT COLECTOMY, LIVER BIOPSY     COLECTOMY N/A 2021    RIGHT COLECTOMY, LIVER BIOPSY performed by Mildred Evangelista MD at [facility]         Immunization History:   Immunization History   Administered Date(s) Administered   Hazle Balm, PF, 100mcg/0.5mL 2020, 2021       Active Problems:  Patient Active Problem List   Diagnosis Code    Small bowel obstruction ([address] K56.609    Ileus ([address] K56.7    Acute cystitis without hematuria N30.00    Elevated liver enzymes R74.8    Colonic mass K63.89    Liver nodule K76.89    Jaundice R17    Metastatic colon cancer to liver ([address] C18.9, C78.7    Transaminitis R74.01    Hyponatremia E87.1    Hypokalemia E87.6    Abdominal pain R10.9       Isolation/Infection:   Isolation          No Isolation        Patient Infection Status     None to display Nurse Assessment:  Last Vital Signs: BP (!) 96/59   Pulse 71   Temp 97.9 °F (36.6 °C) (Oral)   Resp 14   Ht 5' (1.524 m)   Wt 116 lb (52.6 kg)   SpO2 94%   BMI 22.65 kg/m²     Last documented pain score (0-10 scale): Pain Level: 7  Last Weight:   Wt Readings from Last 1 Encounters:   06/11/21 116 lb (52.6 kg)     Mental Status:  oriented    IV Access:  - None    Nursing Mobility/ADLs:  Walking   Dependent  Transfer  Dependent  Bathing  Dependent  Dressing  Dependent  Toileting  Dependent  Feeding  Independent  Med Admin  Assisted  Med Delivery   whole    Wound Care Documentation and Therapy:        Elimination:  Continence:   · Bowel: No  · Bladder: No  Urinary Catheter: None   Colostomy/Ileostomy/Ileal Conduit: No       Date of Last BM: Pt not sure    Intake/Output Summary (Last 24 hours) at 6/12/2021 1312  Last data filed at 6/12/2021 0504  Gross per 24 hour   Intake --   Output 0 ml   Net 0 ml     No intake/output data recorded. Safety Concerns:     None    Impairments/Disabilities:      None    Nutrition Therapy:  Current Nutrition Therapy:   - Oral Diet:  General    Routes of Feeding: Oral  Liquids: No Restrictions  Daily Fluid Restriction: no  Last Modified Barium Swallow with Video (Video Swallowing Test): not done    Treatments at the Time of Hospital Discharge:   Respiratory Treatments: n/a  Oxygen Therapy:  is not on home oxygen therapy.   Ventilator:    - No ventilator support    Rehab Therapies: n/a  Weight Bearing Status/Restrictions: No weight bearing restirctions  Other Medical Equipment (for information only, NOT a DME order):  lift  Other Treatments: n/a    Patient's personal belongings (please select all that are sent with patient):  None    RN SIGNATURE:  Electronically signed by Frank Cantu RN on 6/12/21 at 1:15 PM EDT    CASE MANAGEMENT/SOCIAL WORK SECTION    Inpatient Status Date: ***    Readmission Risk Assessment Score:  Readmission Risk              Risk of Unplanned Readmission:  22           Discharging to Facility/ Agency   · Name:   · Address:  · Phone:  · Fax:    Dialysis Facility (if applicable)   · Name:  · Address:  · Dialysis Schedule:  · Phone:  · Fax:    / signature: {Esignature:[id number]}    PHYSICIAN SECTION    Prognosis: {Prognosis:6029152167}    Condition at Discharge: Rich Hernandez Patient Condition:924560612}    Rehab Potential (if transferring to Rehab): {Prognosis:5031867844}    Recommended Labs or Other Treatments After Discharge: ***    Physician Certification: I certify the above information and transfer of Dale Cespedes  is necessary for the continuing treatment of the diagnosis listed and that she requires {Admit to Appropriate Level of Care:77473} for {GREATER/LESS:071149629} 30 days.      Update Admission H&P: {CHP DME Changes in LLCHO:951530719}    PHYSICIAN SIGNATURE:  {Esignature:[id number]}

## 2021-06-12 NOTE — PROGRESS NOTES
Patient admitted to room 213-2 from ER. Patient oriented to room, call light, bed rails, phone, lights and bathroom. Patient instructed about the schedule of the day including: vital sign frequency, lab draws, possible tests, frequency of MD and staff rounds, daily weights, I &O's and prescribed diet. Bed alarm in place. Bed locked, in lowest position, side rails up 2/4, call light within reach. Recliner Assessment:     Patient is not able to demonstrated the ability to move from a reclining position to an upright position within the recliner. however patient is alert, oriented and able to provide informed consent       4 Eyes Skin Assessment     The patient is being assess for   Admission    I agree that 2 RN's have performed a thorough Head to Toe Skin Assessment on the patient. ALL assessment sites listed below have been assessed. Areas assessed for pressure by both nurses:   [x]   Head, Face, and Ears   [x]   Shoulders, Back, and Chest, Abdomen  [x]   Arms, Elbows, and Hands   [x]   Coccyx, Sacrum, and Ischium  [x]   Legs, Feet, and Heels    Healing midline abdominal incision. Scattered bruising. Jaundiced. Skin Assessed Under all Medical Devices by both nurses: NA      All Mepilex Borders were peeled back and area peeked at by both nurses:  NA  Please list where Mepilex Borders are located: NA             **SHARE this note so that the co-signing nurse is able to place an eSignature**    Co-signer eSignature: {Esignature:[id number]}    Does the Patient have Skin Breakdown related to pressure?   No       Curt Prevention initiated:  NA   Wound Care Orders initiated:  NA      [facility] nurse consulted for Pressure Injury (Stage 3,4, Unstageable, DTI, NWPT, Complex wounds)and New or Established Ostomies:  NA      Primary Nurse eSignature: Electronically signed by Sandy Castillo RN on 6/12/21 at 6:12 AM EDT

## 2021-06-12 NOTE — PLAN OF CARE
Problem: Falls - Risk of:  Goal: Will remain free from falls  Description: Will remain free from falls  6/12/2021 0928 by Janice Paredes RN  Outcome: Ongoing     Problem: Skin Integrity:  Goal: Will show no infection signs and symptoms  Description: Will show no infection signs and symptoms  6/12/2021 0928 by Janice Paredes RN  Outcome: Ongoing  6/12/2021 0026 by Maxime Brooks RN  Outcome: Ongoing     Problem: Skin Integrity:  Goal: Will show no infection signs and symptoms  Description: Will show no infection signs and symptoms  6/12/2021 0928 by Janice Paredes RN  Outcome: Ongoing  6/12/2021 0026 by Maxime Brooks RN  Outcome: Ongoing     Problem: Skin Integrity:  Goal: Will show no infection signs and symptoms  Description: Will show no infection signs and symptoms  6/12/2021 0928 by Janice Paredes RN  Outcome: Ongoing

## 2021-07-01 NOTE — PROGRESS NOTES
[facility] SURGERY      S:   Patient presents s/p R colectomy and liver biopsy 2 weeks  ago. She reports feeling weak. She is jaundiced and not eating. O:   Comfortable         Incision site healing well. She is jaundice. Abdomen soft. Non distended. FINAL DIAGNOSIS:     A. 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 - Radial margin involved. - Pericolonic lymph nodes positive for metastatic carcinoma with focal   extranodal extension (0/18). B. Liver nodule, biopsy:   - Positive for metastatic carcinoma.      Procedure:  Right hemicolectomy   Tumor Site: Ileocecal valve   Tumor Size: Greatest dimension (centimeter): 7        Additional dimensions (centimeters): 6 x 5   Macroscopic tumor perforation: Not identified   Histologic Type: Adenocarcinoma   Histologic Grade: G1 (moderately differentiated)   Tumor Extension:       Tumor directly invades adjacent structures (specify: Terminal ileum)     MARGINS     Proximal Margin         Uninvolved by invasive carcinoma, high grade dysplasia /   intramucosal carcinoma, and low grade dysplasia       Distal Margin         Uninvolved by invasive carcinoma, high grade dysplasia /   intramucosal carcinoma, and low grade dysplasia       Radial (circumferential) or Mesenteric Margin        Involved by invasive carcinoma (tumor present 0-1 mm from margin)     Treatment Effect (applicable to carcinomas treated with neoadjuvant   therapy): No known pre surgical treatment   Lymph-Vascular Invasion: Not identified   Perineural Invasion: Not identified     + Tumor Budding (Note I)         Low score (0-4)     Type of Polyp in Which Invasive Carcinoma Arose: Not applicable   Tumor deposits: Present     Regional lymph nodes     Number of Lymph nodes Involved: 6     Number of Lymph Nodes Examined: 18     Pathologic Stage Classification (pTNM, AJCC 8th Edition)     Primary Tumor (pT):       pT4b: Tumor directly invades or adheres to adjacent organs or   structures (small bowel)     Regional Lymph Nodes (pN):      pN2a     Distant Metastasis (pM) (required only if confirmed pathologically in   this case)      pM1     + Additional pathologic findings: Not applicable. + Ancillary studies: MMR pending         ROCJO/ROCJO                A:   S/P above    P:   I spoke with the hospitalist.  They will admit for evaluation of jaundice from either biliary obstruction or progression of diffuse liver metastatic disease.

## (undated) DEVICE — AGENT HEMSTAT W2XL4IN OXIDIZED REGENERATED CELOS ABSRB

## (undated) DEVICE — PACK,UNIVERSAL,SPLIT,II,AURORA: Brand: MEDLINE

## (undated) DEVICE — STAPLER INT L60MM REG TISS BLU B FRM 8 FIRING 2 ROW AUTO

## (undated) DEVICE — SUTURE PROL SZ 1 L30IN NONABSORBABLE BLU CTX L48MM 1/2 CIR 8455H

## (undated) DEVICE — GOWN,AURORA,NONREINF,RAGLAN,XXL,STERILE: Brand: MEDLINE

## (undated) DEVICE — KIT EVAC 100CC W10MMXL20CM SIL FULL PERF HUBLESS FLAT DRN

## (undated) DEVICE — KIT INFUS PMP 270ML 4ML/HR 2ML/SITE SOAK CATH L5IN N NARC

## (undated) DEVICE — SPONGE LAP W18XL18IN WHT COT 4 PLY FLD STRUNG RADPQ DISP ST

## (undated) DEVICE — ELECTRODE PT RET AD L9FT HI MOIST COND ADH HYDRGEL CORDED

## (undated) DEVICE — SUTURE PERMA-HAND SZ 2-0 L30IN NONABSORBABLE BLK L26MM SH K833H

## (undated) DEVICE — SEALER ENDOSCP NANO COAT OPN DIV CRV L JAW LIGASURE IMPACT

## (undated) DEVICE — APPLICATOR PREP 26ML 0.7% IOD POVACRYLEX 74% ISO ALC ST

## (undated) DEVICE — STAPLER SKIN LEG L3.9MM DIA0.53MM WIDE ROT HD FOR WND CLSR

## (undated) DEVICE — GOWN SIRUS NONREIN XL W/TWL: Brand: MEDLINE INDUSTRIES, INC.

## (undated) DEVICE — RELOAD STPL L75MM OPN H3.8MM CLS 1.5MM WIRE DIA0.2MM REG

## (undated) DEVICE — TRAY CATH 16FR F INCLUDE LUB DRNGE BG STATLOK STBL DEV

## (undated) DEVICE — STAPLER INT L75MM CUT LN L73MM STPL LN L77MM BLU B FRM 8

## (undated) DEVICE — GAUZE,SPONGE,4"X4",8PLY,STRL,LF,10/TRAY: Brand: MEDLINE

## (undated) DEVICE — GLOVE ORANGE PI 8 1/2   MSG9085

## (undated) DEVICE — MAJOR SET UP PK

## (undated) DEVICE — SOLUTION IV IRRIG 500ML 0.9% SODIUM CHL 2F7123

## (undated) DEVICE — SUTURE PERMAHAND SZ 3-0 L18IN NONABSORBABLE BLK L26MM SH C013D

## (undated) DEVICE — SUTURE PERMAHAND SZ 2-0 L30IN NONABSORBABLE BLK SILK W/O A305H

## (undated) DEVICE — SHEATH INTRO 17GA L8IN TUNN DISP ON-Q